# Patient Record
Sex: FEMALE | Race: WHITE | Employment: OTHER | RURAL
[De-identification: names, ages, dates, MRNs, and addresses within clinical notes are randomized per-mention and may not be internally consistent; named-entity substitution may affect disease eponyms.]

---

## 2017-01-04 ENCOUNTER — OFFICE VISIT (OUTPATIENT)
Dept: FAMILY MEDICINE CLINIC | Age: 82
End: 2017-01-04

## 2017-01-04 VITALS
HEART RATE: 62 BPM | WEIGHT: 112.6 LBS | RESPIRATION RATE: 18 BRPM | DIASTOLIC BLOOD PRESSURE: 64 MMHG | SYSTOLIC BLOOD PRESSURE: 128 MMHG | OXYGEN SATURATION: 98 %

## 2017-01-04 DIAGNOSIS — K14.8 TONGUE LESION: Primary | ICD-10-CM

## 2017-01-04 RX ORDER — DEXTROMETHORPHAN POLISTIREX 30 MG/5 ML
SUSPENSION, EXTENDED RELEASE 12 HR ORAL AS NEEDED
COMMUNITY

## 2017-01-09 NOTE — PROGRESS NOTES
1/4/2017 1/8/2017    Chief Complaint   Patient presents with    Mouth Lesions     C/O a bump on the tongue. HPI: Vandana Christianson is a 80 y.o. female. Presents with 3 day history of tongue lesion. Not painful, just aware it's there. No history of trauma, has not bitten her tongue that she is aware of. Allergies   Allergen Reactions    Ambien [Zolpidem] Unknown (comments)    Coconut Unknown (comments)       Current Outpatient Prescriptions   Medication Sig Dispense Refill    eyelid cleanser combination #6 (EYELID WIPES) towl by Apply Externally route daily.  calcium-cholecalciferol, D3, (CALTRATE 600+D) tablet Take 1 Tab by mouth two (2) times a day. 60 Tab 1    gabapentin (NEURONTIN) 300 mg capsule TAKE 2 CAPSULES BY MOUTH EVERY MORNING AND EVERY EVENING 120 Cap 5    gabapentin (NEURONTIN) 100 mg capsule TAKE 1 CAPSULE BY MOUTH ONCE DAILY AT NOON 30 Cap 11    propranolol (INDERAL) 60 mg tablet TAKE 1 TABLET BY MOUTH THREE TIMES DAILY 90 Tab prn    aspirin delayed-release 81 mg tablet TAKE 1 TABLET BY MOUTH ONCE DAILY 100 Tab 3    losartan (COZAAR) 50 mg tablet TAKE 1 TABLET BY MOUTH ONCE DAILY 30 Tab 11    levothyroxine (SYNTHROID) 50 mcg tablet TAKE 1 TABLET BY MOUTH ONCE DAILY 30 Tab 11    Zinc Ox-Aloe Vera-Vitamin E (BALMEX) 11.3 % topical cream Apply  to affected area as needed for Skin Irritation.  cyanocobalamin 1,000 mcg tablet Take 1,000 mcg by mouth daily.  acetaminophen (TYLENOL) 325 mg tablet Take  by mouth every four (4) hours as needed for Pain.  POLYVINYL ALCOHOL/POVIDONE (ARTIFICIAL TEARS OP) Apply  to eye daily as needed.  mineral oil (FLEET) enema Insert  into rectum as needed for Constipation.  LORazepam (ATIVAN) 0.5 mg tablet Take 0.5 mg by mouth two (2) times daily as needed for Anxiety.  loperamide (IMODIUM) 1 mg/7.5 mL solution Take  by mouth three (3) times daily as needed for Diarrhea.       docusate sodium (COLACE) 100 mg capsule Take 100 mg by mouth as needed for Constipation. PRN daily      polyethylene glycol (MIRALAX) 17 gram/dose powder Take 17 g by mouth daily as needed.  magnesium hydroxide (MILK OF MAGNESIA) 400 mg/5 mL suspension Take 30 mL by mouth daily as needed for Constipation.  bisacodyl (DULCOLAX) 10 mg suppository Insert 10 mg into rectum daily as needed.  SENNOSIDES (NATURAL LAXATIVE PO) Take  by mouth. No past medical history on file. Lab Results   Component Value Date/Time    Glucose 99 09/21/2016 02:23 PM    Creatinine 0.84 09/21/2016 02:23 PM       ROS:  Constitutional: No fever, chills or weight loss  Respiratory: No cough, SOB   CV: No chest pain or Palpitations  GI: No nausea, vomiting or diarrhea. : No dysuria or hematuria. Neuro: No headaches, seizures, change in mental status. Physical Exam:   VS Visit Vitals    /64    Pulse 62    Resp 18    Wt 112 lb 9.6 oz (51.1 kg)    SpO2 98%      General Alert, oriented 89 y/o WF. In wheelchair. NAD. Eyes Conjunctiva and lids normal.    PERRLA, EOMI.   ENMT External ears and nose normal.  Canals normal, TMs normal.   Lips, teeth, gums normal, mucous membranes moist.    Oropharynx: no erythema, no exudates, no lesions. Tongue: There is a violaceous raised 5mm lesion on the mid-left tongue. NECK Thyroid: normal size, nontender. Trachea midline, neck symmetrical and without masses. Carotids 2+ with no bruits. Nodes: Left ac shotty nodes Non-tender left submandibular gland. RESP Clear to auscultation and percussion. No rales, wheezes, rhonchi, or rubs. CV RRR, with no S3 or S4, no murmur, no rub. EXT No deformity. Extremities without edema. DP and PT 2+ bilaterally. SKIN Skin warm, normal turgor. NEURO Cranial nerves normal 2-12. No abnormal movement     PSYCH Judgment and insight good. Oriented to person, place, and time. Affect is alert and attentive. Moderate memory loss. 1. Tongue lesion  Consult Dr. Jamal Barakat. Will refer as indicated by CT.   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 Answer:   No    POLYVINYL ALCOHOL/POVIDONE (ARTIFICIAL TEARS OP)     Sig: Apply  to eye daily as needed.  eyelid cleanser combination #6 (EYELID WIPES) towl     Sig: by Apply Externally route daily.  mineral oil (FLEET) enema     Sig: Insert  into rectum as needed for Constipation. Follow-up Disposition:  Return in about 1 week (around 1/11/2017).         EARNEST Bishop

## 2017-04-26 ENCOUNTER — OFFICE VISIT (OUTPATIENT)
Dept: FAMILY MEDICINE CLINIC | Age: 82
End: 2017-04-26

## 2017-04-26 VITALS
OXYGEN SATURATION: 96 % | HEART RATE: 60 BPM | DIASTOLIC BLOOD PRESSURE: 77 MMHG | SYSTOLIC BLOOD PRESSURE: 123 MMHG | RESPIRATION RATE: 18 BRPM

## 2017-04-26 DIAGNOSIS — M77.8 SHOULDER TENDINITIS, RIGHT: Primary | ICD-10-CM

## 2017-04-26 DIAGNOSIS — M75.01 ADHESIVE CAPSULITIS OF RIGHT SHOULDER: ICD-10-CM

## 2017-04-26 RX ORDER — METHYLPREDNISOLONE ACETATE 40 MG/ML
40 INJECTION, SUSPENSION INTRA-ARTICULAR; INTRALESIONAL; INTRAMUSCULAR; SOFT TISSUE ONCE
Qty: 1 VIAL | Refills: 0
Start: 2017-04-26 | End: 2017-04-26

## 2017-05-03 RX ORDER — ZINC GLUCONATE 50 MG
TABLET ORAL
Qty: 30 TAB | Refills: 3 | Status: SHIPPED | OUTPATIENT
Start: 2017-05-03 | End: 2017-09-01 | Stop reason: SDUPTHER

## 2017-06-15 RX ORDER — GABAPENTIN 300 MG/1
CAPSULE ORAL
Qty: 120 CAP | Refills: 2 | Status: SHIPPED | OUTPATIENT
Start: 2017-06-15 | End: 2018-03-16 | Stop reason: SDUPTHER

## 2017-07-12 ENCOUNTER — OFFICE VISIT (OUTPATIENT)
Dept: FAMILY MEDICINE CLINIC | Age: 82
End: 2017-07-12

## 2017-07-12 VITALS
RESPIRATION RATE: 20 BRPM | SYSTOLIC BLOOD PRESSURE: 107 MMHG | TEMPERATURE: 98.1 F | WEIGHT: 121.8 LBS | DIASTOLIC BLOOD PRESSURE: 55 MMHG | HEART RATE: 69 BPM | OXYGEN SATURATION: 96 %

## 2017-07-12 DIAGNOSIS — E53.8 B12 DEFICIENCY: ICD-10-CM

## 2017-07-12 DIAGNOSIS — E03.9 ACQUIRED HYPOTHYROIDISM: ICD-10-CM

## 2017-07-12 DIAGNOSIS — M77.8 SHOULDER TENDINITIS, RIGHT: ICD-10-CM

## 2017-07-12 DIAGNOSIS — I10 ESSENTIAL HYPERTENSION WITH GOAL BLOOD PRESSURE LESS THAN 140/90: Primary | ICD-10-CM

## 2017-07-12 DIAGNOSIS — E55.9 VITAMIN D DEFICIENCY: ICD-10-CM

## 2017-07-12 DIAGNOSIS — Z00.00 MEDICARE ANNUAL WELLNESS VISIT, SUBSEQUENT: ICD-10-CM

## 2017-07-12 RX ORDER — METHYLPREDNISOLONE ACETATE 40 MG/ML
40 INJECTION, SUSPENSION INTRA-ARTICULAR; INTRALESIONAL; INTRAMUSCULAR; SOFT TISSUE ONCE
Qty: 1 VIAL | Refills: 0
Start: 2017-07-12 | End: 2017-07-12

## 2017-07-12 RX ORDER — DICLOFENAC SODIUM 10 MG/G
GEL TOPICAL
COMMUNITY

## 2017-07-12 NOTE — PROGRESS NOTES
7/12/2017    Chief Complaint   Patient presents with    Shoulder Pain     Right shoulder pain, decreased ROM. HPI: Lexie De La Fuente is a 80 y.o. female. [facility] resident. 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 Mod dementia, poor memory. Requires assistance with some ADLs. Presents for recurrent right shoulder pain, decreased ROM. Difficulty using the right arm. Limits her ability to comb her hair and other tasks requiring overhead reaching. She also has a spot on her right forehead she would like to have checked. Has not had labs done > 1 year. HPTN:  ARB, no adverse effects. Orthostatic-postural tremor. Propranolol   Gabapentin. Stable   Non-ambulatory     Allergies   Allergen Reactions    Ambien [Zolpidem] Unknown (comments)    Coconut Unknown (comments)       Current Outpatient Prescriptions   Medication Sig Dispense Refill    methylPREDNISolone acetate (DEPO-MEDROL) 40 mg/mL injection 1 mL by IntraMUSCular route once for 1 dose. 1 Vial 0    gabapentin (NEURONTIN) 300 mg capsule TAKE 2 CAPSULES BY MOUTH EVERY MORNING AND EVERY EVENING 120 Cap 2    VITAMIN B-12 1,000 mcg tablet TAKE 1 TABLET BY MOUTH ONCE DAILY 30 Tab 3    eyelid cleanser combination #6 (EYELID WIPES) towl by Apply Externally route daily.  calcium-cholecalciferol, D3, (CALTRATE 600+D) tablet Take 1 Tab by mouth two (2) times a day. 60 Tab 1    gabapentin (NEURONTIN) 100 mg capsule TAKE 1 CAPSULE BY MOUTH ONCE DAILY AT NOON 30 Cap 11    propranolol (INDERAL) 60 mg tablet TAKE 1 TABLET BY MOUTH THREE TIMES DAILY 90 Tab prn    aspirin delayed-release 81 mg tablet TAKE 1 TABLET BY MOUTH ONCE DAILY 100 Tab 3    losartan (COZAAR) 50 mg tablet TAKE 1 TABLET BY MOUTH ONCE DAILY 30 Tab 11    levothyroxine (SYNTHROID) 50 mcg tablet TAKE 1 TABLET BY MOUTH ONCE DAILY 30 Tab 11    diclofenac (VOLTAREN) 1 % gel Apply  to affected area daily as needed.       MAPAP 325 mg tablet TAKE 2 TABLETS BY MOUTH EVERY 4 HOURS FOR ORAL TEMPERATURE > 100.5 OR COMPLAINT OF PAIN. 60 Tab prn    POLYVINYL ALCOHOL/POVIDONE (ARTIFICIAL TEARS OP) Apply  to eye daily as needed.  mineral oil (FLEET) enema Insert  into rectum as needed for Constipation.  LORazepam (ATIVAN) 0.5 mg tablet Take 0.5 mg by mouth two (2) times daily as needed for Anxiety.  loperamide (IMODIUM) 1 mg/7.5 mL solution Take  by mouth three (3) times daily as needed for Diarrhea.  docusate sodium (COLACE) 100 mg capsule Take 100 mg by mouth as needed for Constipation. PRN daily      polyethylene glycol (MIRALAX) 17 gram/dose powder Take 17 g by mouth daily as needed.  Zinc Ox-Aloe Vera-Vitamin E (BALMEX) 11.3 % topical cream Apply  to affected area as needed for Skin Irritation.  magnesium hydroxide (MILK OF MAGNESIA) 400 mg/5 mL suspension Take 30 mL by mouth daily as needed for Constipation.  bisacodyl (DULCOLAX) 10 mg suppository Insert 10 mg into rectum daily as needed.  SENNOSIDES (NATURAL LAXATIVE PO) Take  by mouth daily as needed. Past Medical History:   Diagnosis Date    Anxiety disorder     Bursitis     Right Shoulder    Chronic kidney disease     Congestive heart failure (HCC)     Essential tremor     Hypertension     Thyroid disease     Hypothyroidism       Lab Results   Component Value Date/Time    Glucose 99 09/21/2016 02:23 PM    Creatinine 0.84 09/21/2016 02:23 PM       ROS:  Constitutional: No fever, chills or weight loss  Respiratory: No cough, SOB   CV: No chest pain or Palpitations  GI: No nausea, vomiting or diarrhea. : No dysuria or hematuria. Neuro: No headaches, seizures, change in mental status. Physical Exam:   VS Visit Vitals    /55    Pulse 69    Temp 98.1 °F (36.7 °C)  Comment: oral    Resp 20    Wt 121 lb 12.8 oz (55.2 kg)    SpO2 96%      General Alert,oriented X 2. NAD. In wheelchair.     Eyes Conjunctiva and lids normal.    PERRLA, EOMI.   ENMT Mucous membranes moist.    Oropharynx: no erythema, no exudates, no lesions, normal tongue. NECK Thyroid: normal size, nontender. Trachea midline, neck symmetrical and without masses. Carotids 2+ with no bruits. No enlarged nodes. RESP Clear to auscultation and percussion. CV RRR, with no S3 or S4, no murmur, no rub. EXT Right shoulder: Decreased ROM, abduction to 75 degrees. Mild-mod tenderness to palpation over superior joint line. Injected with Methylprednisolone 40mg and 2% Lidocaine. See procedure note. SKIN Skin warm, normal turgor. Right lateral forehead: 1cm X 0.6cm slightly raised brown skin lesion consistent with Seborrheic Keratosis. NEURO Cranial nerves normal 2-12. No abnormal movement   PSYCH Judgment and insight good. Fund of knowledge is normal.   Affect is alert and attentive. 1. Essential hypertension with goal blood pressure less than 140/90  Well controlled. - CBC WITH AUTOMATED DIFF  - METABOLIC PANEL, COMPREHENSIVE  - GA COLLECTION VENOUS BLOOD,VENIPUNCTURE    2. Acquired hypothyroidism  Check labs   - CBC WITH AUTOMATED DIFF  - TSH 3RD GENERATION  - GA COLLECTION VENOUS BLOOD,VENIPUNCTURE    3. Shoulder tendinitis, right  Injected with some immediate relief. Will ask PT to assess whether additional PT would be beneficial.   - METHYLPREDNISOLONE ACETATE INJECTION 40 MG  - GA THER/PROPH/DIAG INJECTION, SUBCUT/IM  - methylPREDNISolone acetate (DEPO-MEDROL) 40 mg/mL injection; 1 mL by IntraMUSCular route once for 1 dose. Dispense: 1 Vial; Refill: 0  - DRAIN/INJECT LARGE JOINT/BURSA    4. Vitamin D deficiency  Check labs - VITAMIN D, 25 HYDROXY  - GA COLLECTION VENOUS BLOOD,VENIPUNCTURE    5. B12 deficiency  Check labs  - VITAMIN B12    6.  Seborrheic keratosis  She will return in 2 wks for cryotherapy.            ______________________________________________________________________  Ra Flores is a 80 y.o. female and presents for annual Medicare Wellness Visit. Problem List: Reviewed with patient and discussed risk factors. There is no problem list on file for this patient. Current medical providers:  No care team member to display    PMH, SH, Medications/Allergies: reviewed, on chart. Female Alcohol Screening: On any occasion during the past 3 months, have you had more than 3 drinks containing alcohol? No    Do you average more than 7 drinks per week? No    ROS:  Constitutional: No fever, chills or weight loss  Respiratory: No cough, SOB   CV: No chest pain or Palpitations    Objective:  Visit Vitals    /55    Pulse 69    Temp 98.1 °F (36.7 °C)  Comment: oral    Resp 20    Wt 121 lb 12.8 oz (55.2 kg)    SpO2 96%    There is no height or weight on file to calculate BMI. Assessment of cognitive impairment: Alert and oriented x 2      Depression Screen:   PHQ over the last two weeks 4/26/2017   Little interest or pleasure in doing things Not at all   Feeling down, depressed or hopeless Not at all   Total Score PHQ 2 0       Fall Risk Assessment:    Fall Risk Assessment, last 12 mths 4/26/2017   Able to walk? Yes   Fall in past 12 months? No       Functional Ability:   Does the patient exhibit a steady gait? No, non-ambulatory, postural essentrial tremor   How long did it take the patient to get up and walk from a sitting position? NA   Is the patient self reliant?  (ie can do own laundry, meals, household chores)  no     Does the patient handle his/her own medications?  no     Does the patient handle his/her own money? no     Is the patients home safe (ie good lighting, handrails on stairs and bath, etc.)? yes     Did you notice or did patient express any hearing difficulties? no     Did you notice or did patient express any vision difficulties?    no       Advance Care Planning:   Patient was offered the opportunity to discuss advance care planning:  no     Does patient have an Advance Directive:  yes   If no, did you provide information on Caring Connections?  no       _  Plan:    1. Medicare Wellness  Aged out of screenings. IZs current    2. Advanced directive   On [address] chart. Will get copy into BS chart. Orders Placed This Encounter    DRAIN/INJECT LARGE JOINT/BURSA    CBC WITH AUTOMATED DIFF    METABOLIC PANEL, COMPREHENSIVE    TSH 3RD GENERATION    VITAMIN D, 25 HYDROXY    VITAMIN B12    OR COLLECTION VENOUS BLOOD,VENIPUNCTURE    METHYLPREDNISOLONE ACETATE INJECTION 40 MG    OR THER/PROPH/DIAG INJECTION, SUBCUT/IM    diclofenac (VOLTAREN) 1 % gel    methylPREDNISolone acetate (DEPO-MEDROL) 40 mg/mL injection       Health Maintenance   Topic Date Due    DTaP/Tdap/Td series (1 - Tdap) 09/12/1942    ZOSTER VACCINE AGE 60>  09/12/1981    GLAUCOMA SCREENING Q2Y  09/12/1986    OSTEOPOROSIS SCREENING (DEXA)  09/12/1986    Pneumococcal 65+ Low/Medium Risk (1 of 2 - PCV13) 09/12/1986    MEDICARE YEARLY EXAM  09/12/1986    INFLUENZA AGE 9 TO ADULT  08/01/2017       *Patient verbalized understanding and agreement with the plan. A copy of the After Visit Summary with personalized health plan was given to the patient today. Follow-up Disposition:  Return if symptoms worsen or fail to improve.         EARNEST Bella

## 2017-07-13 LAB
25(OH)D3+25(OH)D2 SERPL-MCNC: 42.7 NG/ML (ref 30–100)
ALBUMIN SERPL-MCNC: 3.9 G/DL (ref 3.2–4.6)
ALBUMIN/GLOB SERPL: 1.4 {RATIO} (ref 1.2–2.2)
ALP SERPL-CCNC: 55 IU/L (ref 39–117)
ALT SERPL-CCNC: 9 IU/L (ref 0–32)
AST SERPL-CCNC: 20 IU/L (ref 0–40)
BASOPHILS # BLD AUTO: 0.1 X10E3/UL (ref 0–0.2)
BASOPHILS NFR BLD AUTO: 1 %
BILIRUB SERPL-MCNC: 0.5 MG/DL (ref 0–1.2)
BUN SERPL-MCNC: 16 MG/DL (ref 10–36)
BUN/CREAT SERPL: 19 (ref 12–28)
CALCIUM SERPL-MCNC: 9.1 MG/DL (ref 8.7–10.3)
CHLORIDE SERPL-SCNC: 91 MMOL/L (ref 96–106)
CO2 SERPL-SCNC: 24 MMOL/L (ref 18–29)
CREAT SERPL-MCNC: 0.86 MG/DL (ref 0.57–1)
EOSINOPHIL # BLD AUTO: 0.4 X10E3/UL (ref 0–0.4)
EOSINOPHIL NFR BLD AUTO: 5 %
ERYTHROCYTE [DISTWIDTH] IN BLOOD BY AUTOMATED COUNT: 13.2 % (ref 12.3–15.4)
GLOBULIN SER CALC-MCNC: 2.7 G/DL (ref 1.5–4.5)
GLUCOSE SERPL-MCNC: 85 MG/DL (ref 65–99)
HCT VFR BLD AUTO: 37.6 % (ref 34–46.6)
HGB BLD-MCNC: 12.5 G/DL (ref 11.1–15.9)
IMM GRANULOCYTES # BLD: 0.1 X10E3/UL (ref 0–0.1)
IMM GRANULOCYTES NFR BLD: 1 %
LYMPHOCYTES # BLD AUTO: 1.7 X10E3/UL (ref 0.7–3.1)
LYMPHOCYTES NFR BLD AUTO: 27 %
MCH RBC QN AUTO: 33.7 PG (ref 26.6–33)
MCHC RBC AUTO-ENTMCNC: 33.2 G/DL (ref 31.5–35.7)
MCV RBC AUTO: 101 FL (ref 79–97)
MONOCYTES # BLD AUTO: 0.8 X10E3/UL (ref 0.1–0.9)
MONOCYTES NFR BLD AUTO: 13 %
NEUTROPHILS # BLD AUTO: 3.4 X10E3/UL (ref 1.4–7)
NEUTROPHILS NFR BLD AUTO: 53 %
PLATELET # BLD AUTO: 220 X10E3/UL (ref 150–379)
POTASSIUM SERPL-SCNC: 4.5 MMOL/L (ref 3.5–5.2)
PROT SERPL-MCNC: 6.6 G/DL (ref 6–8.5)
RBC # BLD AUTO: 3.71 X10E6/UL (ref 3.77–5.28)
SODIUM SERPL-SCNC: 131 MMOL/L (ref 134–144)
TSH SERPL DL<=0.005 MIU/L-ACNC: 1.4 UIU/ML (ref 0.45–4.5)
VIT B12 SERPL-MCNC: 1323 PG/ML (ref 211–946)
WBC # BLD AUTO: 6.4 X10E3/UL (ref 3.4–10.8)

## 2017-07-20 ENCOUNTER — OFFICE VISIT (OUTPATIENT)
Dept: FAMILY MEDICINE CLINIC | Age: 82
End: 2017-07-20

## 2017-07-20 DIAGNOSIS — L82.0 SEBORRHEIC KERATOSES, INFLAMED: Primary | ICD-10-CM

## 2017-07-20 NOTE — PROGRESS NOTES
7/20/2017    CC: Freeze spot on right temple    HPI: Breezy Melton is a 80 y.o. female. [facility] resident. 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 Mod dementia, poor memory. Requires assistance with some ADLs. Spot on her right forehead that gets irritated by her glasses. HPTN:  ARB, no adverse effects. Orthostatic-postural tremor. Propranolol   Gabapentin. Stable   Non-ambulatory     Allergies   Allergen Reactions    Ambien [Zolpidem] Unknown (comments)    Coconut Unknown (comments)       Current Outpatient Prescriptions   Medication Sig Dispense Refill    methylPREDNISolone acetate (DEPO-MEDROL) 40 mg/mL injection 1 mL by IntraMUSCular route once for 1 dose. 1 Vial 0    gabapentin (NEURONTIN) 300 mg capsule TAKE 2 CAPSULES BY MOUTH EVERY MORNING AND EVERY EVENING 120 Cap 2    VITAMIN B-12 1,000 mcg tablet TAKE 1 TABLET BY MOUTH ONCE DAILY 30 Tab 3    eyelid cleanser combination #6 (EYELID WIPES) towl by Apply Externally route daily.  calcium-cholecalciferol, D3, (CALTRATE 600+D) tablet Take 1 Tab by mouth two (2) times a day. 60 Tab 1    gabapentin (NEURONTIN) 100 mg capsule TAKE 1 CAPSULE BY MOUTH ONCE DAILY AT NOON 30 Cap 11    propranolol (INDERAL) 60 mg tablet TAKE 1 TABLET BY MOUTH THREE TIMES DAILY 90 Tab prn    aspirin delayed-release 81 mg tablet TAKE 1 TABLET BY MOUTH ONCE DAILY 100 Tab 3    losartan (COZAAR) 50 mg tablet TAKE 1 TABLET BY MOUTH ONCE DAILY 30 Tab 11    levothyroxine (SYNTHROID) 50 mcg tablet TAKE 1 TABLET BY MOUTH ONCE DAILY 30 Tab 11    diclofenac (VOLTAREN) 1 % gel Apply  to affected area daily as needed.  MAPAP 325 mg tablet TAKE 2 TABLETS BY MOUTH EVERY 4 HOURS FOR ORAL TEMPERATURE > 100.5 OR COMPLAINT OF PAIN. 60 Tab prn    POLYVINYL ALCOHOL/POVIDONE (ARTIFICIAL TEARS OP) Apply  to eye daily as needed.  mineral oil (FLEET) enema Insert  into rectum as needed for Constipation.       LORazepam (ATIVAN) 0.5 mg tablet Take 0.5 mg by mouth two (2) times daily as needed for Anxiety.  loperamide (IMODIUM) 1 mg/7.5 mL solution Take  by mouth three (3) times daily as needed for Diarrhea.  docusate sodium (COLACE) 100 mg capsule Take 100 mg by mouth as needed for Constipation. PRN daily      polyethylene glycol (MIRALAX) 17 gram/dose powder Take 17 g by mouth daily as needed.  Zinc Ox-Aloe Vera-Vitamin E (BALMEX) 11.3 % topical cream Apply  to affected area as needed for Skin Irritation.  magnesium hydroxide (MILK OF MAGNESIA) 400 mg/5 mL suspension Take 30 mL by mouth daily as needed for Constipation.  bisacodyl (DULCOLAX) 10 mg suppository Insert 10 mg into rectum daily as needed.  SENNOSIDES (NATURAL LAXATIVE PO) Take  by mouth daily as needed. Past Medical History:   Diagnosis Date    Anxiety disorder     Bursitis     Right Shoulder    Chronic kidney disease     Congestive heart failure (HCC)     Essential tremor     Hypertension     Thyroid disease     Hypothyroidism       Lab Results   Component Value Date/Time    Glucose 99 09/21/2016 02:23 PM    Creatinine 0.84 09/21/2016 02:23 PM       ROS:  Constitutional: No fever, chills or weight loss  Respiratory: No cough, SOB   CV: No chest pain or Palpitations  GI: No nausea, vomiting or diarrhea. : No dysuria or hematuria. Neuro: No headaches, seizures, change in mental status. Physical Exam:   VS Visit Vitals    /55    Pulse 69    Temp 98.1 °F (36.7 °C)  Comment: oral    Resp 20    Wt 121 lb 12.8 oz (55.2 kg)    SpO2 96%      General Alert,oriented X 2. NAD. In wheelchair. Eyes Conjunctiva and lids normal.    PERRLA, EOMI.   ENMT Mucous membranes moist.    Oropharynx: no erythema, no exudates, no lesions, normal tongue. NECK Thyroid: normal size, nontender. Trachea midline, neck symmetrical and without masses. Carotids 2+ with no bruits. No enlarged nodes. RESP Clear to auscultation and percussion.        CV RRR, with no S3 or S4, no murmur, no rub. SKIN Skin warm, normal turgor. Right lateral forehead: 1cm X 0.6cm slightly raised brown skin lesion consistent with Seborrheic Keratosis. Liquid N2 applied. NEURO Cranial nerves normal 2-12. No abnormal movement   PSYCH Judgment and insight fair. Impaired short term memory. .   Affect is alert and attentive.      1. Seborrheic keratoses, inflamed  Treated topically Liquid N2  - DE DESTRUC BENIGN LESION, UP TO [address], APRN, DNP

## 2017-07-22 ENCOUNTER — TELEPHONE (OUTPATIENT)
Dept: FAMILY MEDICINE CLINIC | Age: 82
End: 2017-07-22

## 2017-07-22 NOTE — TELEPHONE ENCOUNTER
I received a call through the exchange from the patient's nurse this morning. No further information was given  . I attempted to return the call to the nurse ×3    With no answer. Message was left on her voicemail    I attempted to call the patient who answered.   Sounded fine on the phone but was quite hard of hearing and unable to understand any questions    I called her nurse back and left a message for her to get in touch with me  with any questions or problems

## 2017-07-26 ENCOUNTER — OFFICE VISIT (OUTPATIENT)
Dept: FAMILY MEDICINE CLINIC | Age: 82
End: 2017-07-26

## 2017-07-26 VITALS
SYSTOLIC BLOOD PRESSURE: 123 MMHG | TEMPERATURE: 98 F | RESPIRATION RATE: 20 BRPM | OXYGEN SATURATION: 98 % | DIASTOLIC BLOOD PRESSURE: 71 MMHG

## 2017-07-26 DIAGNOSIS — M77.8 SHOULDER TENDINITIS, RIGHT: ICD-10-CM

## 2017-07-26 DIAGNOSIS — L82.0 SEBORRHEIC KERATOSES, INFLAMED: ICD-10-CM

## 2017-07-26 DIAGNOSIS — M75.01 ADHESIVE CAPSULITIS OF RIGHT SHOULDER: ICD-10-CM

## 2017-07-26 DIAGNOSIS — R26.9 NEUROLOGIC AMBULATION DISORDER: Primary | ICD-10-CM

## 2017-07-26 DIAGNOSIS — G25.2 POSTURAL TREMOR: ICD-10-CM

## 2017-07-26 NOTE — PROGRESS NOTES
7/26/2017    Chief Complaint   Patient presents with    Follow-up     Right shoulder pain       HPI: Aminata Loya is a 80 y.o. female. Resident of [place]. She is non-ambulatory due to postural tremor - ambulatory dysfunction. She presents today, had an episode this morning that upset her. She was trying to get to the bathroom but her shower towel was on the floor. She was able to stand up but unable to move and pressed her pendant for help and her aid responded. She continues to have right shoulder pain. Had steroid injection 2 wks ago. Helped for a few days. Seborrheic keratosis treated cryotherapy is healing. Allergies   Allergen Reactions    Ambien [Zolpidem] Unknown (comments)    Coconut Unknown (comments)       Current Outpatient Prescriptions   Medication Sig Dispense Refill    diclofenac (VOLTAREN) 1 % gel Apply  to affected area daily as needed.  gabapentin (NEURONTIN) 300 mg capsule TAKE 2 CAPSULES BY MOUTH EVERY MORNING AND EVERY EVENING 120 Cap 2    MAPAP 325 mg tablet TAKE 2 TABLETS BY MOUTH EVERY 4 HOURS FOR ORAL TEMPERATURE > 100.5 OR COMPLAINT OF PAIN. 60 Tab prn    VITAMIN B-12 1,000 mcg tablet TAKE 1 TABLET BY MOUTH ONCE DAILY 30 Tab 3    POLYVINYL ALCOHOL/POVIDONE (ARTIFICIAL TEARS OP) Apply  to eye daily as needed.  mineral oil (FLEET) enema Insert  into rectum as needed for Constipation.  calcium-cholecalciferol, D3, (CALTRATE 600+D) tablet Take 1 Tab by mouth two (2) times a day.  60 Tab 1    gabapentin (NEURONTIN) 100 mg capsule TAKE 1 CAPSULE BY MOUTH ONCE DAILY AT NOON 30 Cap 11    propranolol (INDERAL) 60 mg tablet TAKE 1 TABLET BY MOUTH THREE TIMES DAILY 90 Tab prn    aspirin delayed-release 81 mg tablet TAKE 1 TABLET BY MOUTH ONCE DAILY 100 Tab 3    losartan (COZAAR) 50 mg tablet TAKE 1 TABLET BY MOUTH ONCE DAILY 30 Tab 11    levothyroxine (SYNTHROID) 50 mcg tablet TAKE 1 TABLET BY MOUTH ONCE DAILY 30 Tab 11    LORazepam (ATIVAN) 0.5 mg tablet Take 0.5 mg by mouth two (2) times daily as needed for Anxiety.  loperamide (IMODIUM) 1 mg/7.5 mL solution Take  by mouth three (3) times daily as needed for Diarrhea.  docusate sodium (COLACE) 100 mg capsule Take 100 mg by mouth as needed for Constipation. PRN daily      polyethylene glycol (MIRALAX) 17 gram/dose powder Take 17 g by mouth daily as needed.  Zinc Ox-Aloe Vera-Vitamin E (BALMEX) 11.3 % topical cream Apply  to affected area as needed for Skin Irritation.  magnesium hydroxide (MILK OF MAGNESIA) 400 mg/5 mL suspension Take 30 mL by mouth daily as needed for Constipation.  bisacodyl (DULCOLAX) 10 mg suppository Insert 10 mg into rectum daily as needed.  SENNOSIDES (NATURAL LAXATIVE PO) Take  by mouth daily as needed.  eyelid cleanser combination #6 (EYELID WIPES) towl by Apply Externally route daily. Past Medical History:   Diagnosis Date    Anxiety disorder     Bursitis     Right Shoulder    Chronic kidney disease     Congestive heart failure (HCC)     Essential tremor     Hypertension     Thyroid disease     Hypothyroidism       Lab Results   Component Value Date/Time    Glucose 85 07/12/2017 12:48 PM    Creatinine 0.86 07/12/2017 12:48 PM       ROS:  Constitutional: No fever, chills or weight loss  Respiratory: No cough, SOB   CV: No chest pain or Palpitations  GI: No nausea, vomiting or diarrhea. : No dysuria or hematuria. Neuro: No headaches, seizures, change in mental status. Physical Exam:   VS Visit Vitals    /71    Temp 98 °F (36.7 °C)    Resp 20    SpO2 98%      General Alert,oriented X 3. NAD. Non-ambulatory in wheelchair. Eyes Conjunctiva and lids normal.    PERRLA, EOMI.   ENMT Mucous membranes moist.    Oropharynx: no erythema, no exudates, no lesions, normal tongue. NECK Thyroid: normal size, nontender. Trachea midline, neck symmetrical and without masses. Carotids 2+ with no bruits. No enlarged nodes.    RESP Clear to auscultation and percussion. No rales, wheezes, rhonchi, or rubs. CV RRR, with no S3 or S4, no murmur, no rub. GI   Normal bowel sounds, no bruit, soft, nontender, without masses. MSKEL Normal gait and station. Normal strength and tone, no atrophy. EXT Right shoulder: No active ROM. Abduction and flexion using left arm to raise. Extremities without edema. SKIN Skin warm, normal turgor. Seborrheic kerasosis on the right temple bullae forming with 1-2 mm erythema surrounding. (normal appearing after crytotherapy)    NEURO Cranial nerves normal 2-12. PSYCH Judgment and insight fair. Fund of knowledge is normal.   Affect is alert and attentive. 1. Shoulder tendinitis, right  Chronic. Suspect rotator cuff tendinitis-disruption     2. Adhesive capsulitis of right shoulder  Chronic. 3. Seborrheic keratoses, inflamed  S/P cryotherapy. Healing normally. 4. Neurologic ambulation disorder  Chronic orthostatic-postural tremor. Reassure her that her \"freezing\" this morning is normal for someone with her tremor and ambulatory dysfunction. 5. Postural tremor  Chronic, stable. Reassure    No orders of the defined types were placed in this encounter. Follow-up Disposition:  Return if symptoms worsen or fail to improve.         EARNEST Rain

## 2017-09-01 RX ORDER — LANOLIN ALCOHOL/MO/W.PET/CERES
CREAM (GRAM) TOPICAL
Qty: 30 TAB | Refills: 3 | Status: SHIPPED | OUTPATIENT
Start: 2017-09-01 | End: 2018-09-14 | Stop reason: SDUPTHER

## 2018-03-20 RX ORDER — GABAPENTIN 300 MG/1
CAPSULE ORAL
Qty: 120 CAP | Status: SHIPPED | OUTPATIENT
Start: 2018-03-20

## 2019-01-22 ENCOUNTER — CLINICAL SUPPORT (OUTPATIENT)
Dept: CARDIOLOGY CLINIC | Age: 84
End: 2019-01-22

## 2019-01-22 ENCOUNTER — OFFICE VISIT (OUTPATIENT)
Dept: CARDIOLOGY CLINIC | Age: 84
End: 2019-01-22

## 2019-01-22 VITALS
HEART RATE: 64 BPM | DIASTOLIC BLOOD PRESSURE: 52 MMHG | OXYGEN SATURATION: 91 % | RESPIRATION RATE: 16 BRPM | WEIGHT: 121 LBS | HEIGHT: 58 IN | BODY MASS INDEX: 25.4 KG/M2 | SYSTOLIC BLOOD PRESSURE: 102 MMHG

## 2019-01-22 DIAGNOSIS — I49.5 SINUS NODE DYSFUNCTION (HCC): ICD-10-CM

## 2019-01-22 DIAGNOSIS — I10 HYPERTENSION, UNSPECIFIED TYPE: Primary | ICD-10-CM

## 2019-01-22 DIAGNOSIS — Z95.0 CARDIAC PACEMAKER IN SITU: Primary | ICD-10-CM

## 2019-01-22 RX ORDER — HYDROCODONE BITARTRATE AND ACETAMINOPHEN 5; 325 MG/1; MG/1
1 TABLET ORAL
COMMUNITY
Start: 2018-10-18

## 2019-01-22 RX ORDER — DULOXETIN HYDROCHLORIDE 30 MG/1
30 CAPSULE, DELAYED RELEASE ORAL DAILY
COMMUNITY
Start: 2018-12-14

## 2019-01-22 RX ORDER — MIRTAZAPINE 15 MG/1
7.5 TABLET, FILM COATED ORAL
COMMUNITY
Start: 2018-12-27

## 2019-01-22 NOTE — PROGRESS NOTES
Subjective:  
  
Bessie Junior is a 80 y.o. female is here for EP consult. The patient denies chest pain/ shortness of breath, orthopnea, PND, LE edema, palpitations, syncope, presyncope or fatigue. She reports having PPM implanted in 2014 for syncope in [place]. Has been in [place] for 3 years with no check. There are no active problems to display for this patient. Marcie Quach NP Past Medical History:  
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 No pertinent surgical history. Allergies Allergen Reactions  Ambien [Zolpidem] Unknown (comments)  Coconut Unknown (comments) History reviewed. No pertinent family history. negative for cardiac disease Social History Socioeconomic History  Marital status: UNKNOWN Spouse name: Not on file  Number of children: Not on file  Years of education: Not on file  Highest education level: Not on file Tobacco Use  Smoking status: Former Smoker  Smokeless tobacco: Never Used Current Outpatient Medications Medication Sig  
 HYDROcodone-acetaminophen (NORCO) 5-325 mg per tablet Take 1 Tab by mouth every four (4) hours as needed.  DULoxetine (CYMBALTA) 30 mg capsule  mirtazapine (REMERON) 15 mg tablet 7.5 mg.  
 levothyroxine (SYNTHROID) 50 mcg tablet TAKE 1 TABLET BY MOUTH EVERY DAY  VITAMIN B-12 1,000 mcg tablet TAKE 1 TABLET BY MOUTH ONCE DAILY  gabapentin (NEURONTIN) 300 mg capsule TAKE 2 CAPSULES BY MOUTH EVERY MORNING AND EVERY EVENING  
 aspirin delayed-release 81 mg tablet TAKE 1 TABLET BY MOUTH ONCE DAILY  losartan (COZAAR) 50 mg tablet TAKE 1 TABLET BY MOUTH ONCE DAILY  gabapentin (NEURONTIN) 100 mg capsule TAKE 1 CAPSULE BY MOUTH ONCE DAILY AT [facility] BR[facility]EN ARROW  propranolol (INDERAL) 60 mg tablet TAKE 1 TABLET BY MOUTH THREE TIMES DAILY  diclofenac (VOLTAREN) 1 % gel Apply  to affected area daily as needed.  MAPAP 325 mg tablet TAKE 2 TABLETS BY MOUTH EVERY 4 HOURS FOR ORAL TEMPERATURE > 100.5 OR COMPLAINT OF PAIN.  POLYVINYL ALCOHOL/POVIDONE (ARTIFICIAL TEARS OP) Apply  to eye daily as needed.  eyelid cleanser combination #6 (EYELID WIPES) towl by Apply Externally route daily.  mineral oil (FLEET) enema Insert  into rectum as needed for Constipation.  calcium-cholecalciferol, D3, (CALTRATE 600+D) tablet Take 1 Tab by mouth two (2) times a day.  LORazepam (ATIVAN) 0.5 mg tablet Take 0.5 mg by mouth two (2) times daily as needed for Anxiety.  loperamide (IMODIUM) 1 mg/7.5 mL solution Take  by mouth three (3) times daily as needed for Diarrhea.  docusate sodium (COLACE) 100 mg capsule Take 100 mg by mouth as needed for Constipation. PRN daily  polyethylene glycol (MIRALAX) 17 gram/dose powder Take 17 g by mouth daily as needed.  Zinc Ox-Aloe Vera-Vitamin E (BALMEX) 11.3 % topical cream Apply  to affected area as needed for Skin Irritation.  magnesium hydroxide (MILK OF MAGNESIA) 400 mg/5 mL suspension Take 30 mL by mouth daily as needed for Constipation.  bisacodyl (DULCOLAX) 10 mg suppository Insert 10 mg into rectum daily as needed.  SENNOSIDES (NATURAL LAXATIVE PO) Take  by mouth daily as needed. No current facility-administered medications for this visit. Vitals:  
 01/22/19 7027 BP: 102/52 Pulse: 64 Resp: 16 SpO2: 91% Weight: 121 lb (54.9 kg) Height: 4' 10\" (1.473 m) I have reviewed the nurses notes, vitals, problem list, allergy list, medical history, family, social history and medications. Review of Symptoms: 
 
General: Pt denies excessive weight gain or loss.  Pt is able to conduct ADL's 
 HEENT: Denies blurred vision, headaches, epistaxis and difficulty swallowing. Respiratory: Denies shortness of breath, SKAGGS, wheezing or stridor. Cardiovascular: Denies precordial pain, palpitations, edema or PND Gastrointestinal: Denies poor appetite, indigestion, abdominal pain or blood in stool Urinary: Denies dysuria, pyuria Musculoskeletal: Denies pain or swelling from muscles or joints Neurologic: Denies tremor, paresthesias, or sensory motor disturbance Skin: Denies rash, itching or texture change. Psych: Denies depression Physical Exam:   
 
General: Well developed, in no acute distress. HEENT: Eyes - PERRL, no jvd Heart:  Normal S1/S2 negative S3 or S4. Regular, no murmur, gallop or rub.  
Respiratory: Clear bilaterally x 4, no wheezing or rales Extremities:  No edema, normal cap refill, no cyanosis. Musculoskeletal: No clubbing Neuro: A&Ox3, speech clear, gait stable. Skin: Skin color is normal. No rashes or lesions. Non diaphoretic Vascular: 2+ pulses symmetric in all extremities Cardiographics Ekg: Sinus   Rhythm Low voltage in precordial leads.  
 -Anterior infarct -age undetermined. No results found for this or any previous visit. Lab Results Component Value Date/Time WBC 7.5 11/29/2018 11:50 AM  
 HGB 11.7 11/29/2018 11:50 AM  
 HCT 34.4 (L) 11/29/2018 11:50 AM  
 PLATELET 023 27/01/0856 11:50 AM  
 .4 (H) 11/29/2018 11:50 AM  
  
Lab Results Component Value Date/Time  Sodium 137 11/29/2018 11:50 AM  
 Potassium 4.4 11/29/2018 11:50 AM  
 Chloride 99 11/29/2018 11:50 AM  
 CO2 29 11/29/2018 11:50 AM  
 Anion gap 9 11/29/2018 11:50 AM  
 Glucose 90 11/29/2018 11:50 AM  
 BUN 16 11/29/2018 11:50 AM  
 Creatinine 0.92 11/29/2018 11:50 AM  
 BUN/Creatinine ratio 17 11/29/2018 11:50 AM  
 GFR est AA >60 11/29/2018 11:50 AM  
 GFR est non-AA 57 (L) 11/29/2018 11:50 AM  
 Calcium 9.0 11/29/2018 11:50 AM  
 Bilirubin, total 0.6 11/29/2018 11:50 AM  
 AST (SGOT) 23 2018 11:50 AM  
 Alk. phosphatase 50 2018 11:50 AM  
 Protein, total 6.3 (L) 2018 11:50 AM  
 Albumin 3.1 (L) 2018 11:50 AM  
 Globulin 3.2 2018 11:50 AM  
 A-G Ratio 1.0 (L) 2018 11:50 AM  
 ALT (SGPT) 17 2018 11:50 AM  
  
Lab Results Component Value Date/Time TSH 3.02 2018 11:50 AM  
 
 
 Assessment: ICD-10-CM ICD-9-CM 1. Hypertension, unspecified type I10 401.9 AMB POC EKG ROUTINE W/ 12 LEADS, INTER & REP Orders Placed This Encounter  AMB POC EKG ROUTINE W/ 12 LEADS, INTER & REP Order Specific Question:   Reason for Exam: Answer:   routine  HYDROcodone-acetaminophen (NORCO) 5-325 mg per tablet Sig: Take 1 Tab by mouth every four (4) hours as needed.  DULoxetine (CYMBALTA) 30 mg capsule  mirtazapine (REMERON) 15 mg tablet Si.5 mg.  
 
 
 Plan:  
 
Ms Moore is a pleasant 80year old female with hx of syncope with PPM, last interrogated > 3 years. Device interrogation today with 83% AP and 0.1% RVP, battery life is 7.5 years. Will enroll in remote monitoring and follow up in 1 year. Continue medical management for HTN, SSS and hypothyroid. Thank you for allowing me to participate in Suzi Blanco 's care. Fernando Jarquin NP Patient seen and examined. All pertinent data reviewed. I have reviewed detailed note as outlined by Fernando Jarquin NP. Case discussed with Nursing/medical assistant staff and Fernando Jarquin NP. Plans as outlined. A paced 83% for sick sinus. No sycnope. Cont med rx for htn and hypothroidism. Enroll in our device clinic. F/u in one year.  
 
Charly Thomas MD, Brandie Scanlon

## 2019-01-22 NOTE — PROGRESS NOTES
Chief Complaint Patient presents with  New Patient  
  referred by PCP, hasn't had pacemaker check in 3 yrs. 1. Have you been to the ER, urgent care clinic since your last visit? Hospitalized since your last visit? No 
 
2. Have you seen or consulted any other health care providers outside of the [address] [place] [address] since your last visit? Include any pap smears or colon screening.  No

## 2019-04-24 ENCOUNTER — CLINICAL SUPPORT (OUTPATIENT)
Dept: CARDIOLOGY CLINIC | Age: 84
End: 2019-04-24

## 2019-04-24 DIAGNOSIS — Z95.0 CARDIAC PACEMAKER IN SITU: Primary | ICD-10-CM

## 2019-04-24 DIAGNOSIS — I49.5 SINUS NODE DYSFUNCTION (HCC): ICD-10-CM

## 2019-07-24 ENCOUNTER — CLINICAL SUPPORT (OUTPATIENT)
Dept: CARDIOLOGY CLINIC | Age: 84
End: 2019-07-24

## 2019-07-24 DIAGNOSIS — Z95.0 CARDIAC PACEMAKER IN SITU: Primary | ICD-10-CM

## 2019-07-24 DIAGNOSIS — I49.5 SINUS NODE DYSFUNCTION (HCC): ICD-10-CM

## 2019-10-28 ENCOUNTER — CLINICAL SUPPORT (OUTPATIENT)
Dept: CARDIOLOGY CLINIC | Age: 84
End: 2019-10-28

## 2019-10-28 DIAGNOSIS — Z95.0 CARDIAC PACEMAKER IN SITU: Primary | ICD-10-CM

## 2019-10-28 DIAGNOSIS — I49.5 SINUS NODE DYSFUNCTION (HCC): ICD-10-CM

## 2020-03-04 ENCOUNTER — CLINICAL SUPPORT (OUTPATIENT)
Dept: CARDIOLOGY CLINIC | Age: 85
End: 2020-03-04

## 2020-03-04 ENCOUNTER — OFFICE VISIT (OUTPATIENT)
Dept: CARDIOLOGY CLINIC | Age: 85
End: 2020-03-04

## 2020-03-04 VITALS
RESPIRATION RATE: 16 BRPM | SYSTOLIC BLOOD PRESSURE: 100 MMHG | OXYGEN SATURATION: 95 % | DIASTOLIC BLOOD PRESSURE: 60 MMHG | HEART RATE: 60 BPM | HEIGHT: 58 IN | BODY MASS INDEX: 26.96 KG/M2

## 2020-03-04 DIAGNOSIS — I10 HYPERTENSION, UNSPECIFIED TYPE: ICD-10-CM

## 2020-03-04 DIAGNOSIS — I49.5 SINUS NODE DYSFUNCTION (HCC): ICD-10-CM

## 2020-03-04 DIAGNOSIS — Z95.0 CARDIAC PACEMAKER IN SITU: ICD-10-CM

## 2020-03-04 DIAGNOSIS — Z95.0 CARDIAC PACEMAKER IN SITU: Primary | ICD-10-CM

## 2020-03-04 DIAGNOSIS — I49.5 SINUS NODE DYSFUNCTION (HCC): Primary | ICD-10-CM

## 2020-03-04 NOTE — PROGRESS NOTES
Subjective:      Chauncey Arnett is a 80 y.o. female is here for EP follow up. The patient denies chest pain/ shortness of breath, orthopnea, PND, LE edema, palpitations, syncope, presyncope or fatigue. There are no active problems to display for this patient. Ethel Smith NP  Past Medical History:   Diagnosis Date    Anxiety disorder     Bursitis     Right Shoulder    Chronic kidney disease     Congestive heart failure ([address] [address]     Essential tremor     Hypertension     Pacemaker 2014    Postural tremor     Legs, unable to ambulate    Thyroid disease     Hypothyroidism      No past surgical history on file. Allergies   Allergen Reactions    Ambien [Zolpidem] Unknown (comments)    Coconut Unknown (comments)      No family history on file. negative for cardiac disease  Social History     Socioeconomic History    Marital status:      Spouse name: Not on file    Number of children: Not on file    Years of education: Not on file    Highest education level: Not on file   Tobacco Use    Smoking status: Former Smoker    Smokeless tobacco: Never Used    Tobacco comment: quit about 50 years ago   Substance and Sexual Activity    Alcohol use: Yes     Alcohol/week: 7.0 standard drinks     Types: 7 Glasses of wine per week     Comment: 1 glass of wine daily    Drug use: Never     Current Outpatient Medications   Medication Sig    HYDROcodone-acetaminophen (NORCO) 5-325 mg per tablet Take 1 Tab by mouth every four (4) hours as needed.  DULoxetine (CYMBALTA) 30 mg capsule Take 30 mg by mouth daily.  mirtazapine (REMERON) 15 mg tablet Take 7.5 mg by mouth nightly.  levothyroxine (SYNTHROID) 50 mcg tablet TAKE 1 TABLET BY MOUTH EVERY DAY (Patient taking differently: Take 75 mcg by mouth Daily (before breakfast). )    VITAMIN B-12 1,000 mcg tablet TAKE 1 TABLET BY MOUTH ONCE DAILY    gabapentin (NEURONTIN) 300 mg capsule TAKE 2 CAPSULES BY MOUTH EVERY MORNING AND EVERY EVENING    aspirin delayed-release 81 mg tablet TAKE 1 TABLET BY MOUTH ONCE DAILY    losartan (COZAAR) 50 mg tablet TAKE 1 TABLET BY MOUTH ONCE DAILY (Patient taking differently: Take 25 mg by mouth daily.)    gabapentin (NEURONTIN) 100 mg capsule TAKE 1 CAPSULE BY MOUTH ONCE DAILY AT NOON    propranolol (INDERAL) 60 mg tablet TAKE 1 TABLET BY MOUTH THREE TIMES DAILY    MAPAP 325 mg tablet TAKE 2 TABLETS BY MOUTH EVERY 4 HOURS FOR ORAL TEMPERATURE > 100.5 OR COMPLAINT OF PAIN.  POLYVINYL ALCOHOL/POVIDONE (ARTIFICIAL TEARS OP) Apply  to eye daily as needed.  eyelid cleanser combination #6 (EYELID WIPES) towl by Apply Externally route daily.  mineral oil (FLEET) enema Insert  into rectum as needed for Constipation.  calcium-cholecalciferol, D3, (CALTRATE 600+D) tablet Take 1 Tab by mouth two (2) times a day.  LORazepam (ATIVAN) 0.5 mg tablet Take 0.5 mg by mouth two (2) times daily as needed for Anxiety.  loperamide (IMODIUM) 1 mg/7.5 mL solution Take  by mouth three (3) times daily as needed for Diarrhea.  docusate sodium (COLACE) 100 mg capsule Take 100 mg by mouth as needed for Constipation. PRN daily    polyethylene glycol (MIRALAX) 17 gram/dose powder Take 17 g by mouth daily as needed.  Zinc Ox-Aloe Vera-Vitamin E (BALMEX) 11.3 % topical cream Apply  to affected area as needed for Skin Irritation.  magnesium hydroxide (MILK OF MAGNESIA) 400 mg/5 mL suspension Take 30 mL by mouth daily as needed for Constipation.  bisacodyl (DULCOLAX) 10 mg suppository Insert 10 mg into rectum daily as needed.  SENNOSIDES (NATURAL LAXATIVE PO) Take  by mouth daily as needed.  diclofenac (VOLTAREN) 1 % gel Apply  to affected area daily as needed. No current facility-administered medications for this visit.        Vitals:    03/04/20 1350   BP: 100/60   Pulse: 60   Resp: 16   SpO2: 95%   Height: 4' 10\" (1.473 m)       I have reviewed the nurses notes, vitals, problem list, allergy list, medical history, family, social history and medications. Review of Symptoms:    General: Pt denies excessive weight gain or loss. Pt is able to conduct ADL's  HEENT: Denies blurred vision, headaches, epistaxis and difficulty swallowing. Respiratory: Denies shortness of breath, SKAGGS, wheezing or stridor. Cardiovascular: Denies precordial pain, palpitations, edema or PND  Gastrointestinal: Denies poor appetite, indigestion, abdominal pain or blood in stool  Urinary: Denies dysuria, pyuria  Musculoskeletal: Denies pain or swelling from muscles or joints  Neurologic: Denies tremor, paresthesias, or sensory motor disturbance  Skin: Denies rash, itching or texture change. Psych: Denies depression      Physical Exam:      General: Well developed, in no acute distress. HEENT: Eyes - PERRL, no jvd  Heart:  Normal S1/S2 negative S3 or S4. Regular, no murmur, gallop or rub. Respiratory: Clear bilaterally x 4, no wheezing or rales  Extremities:  No edema, normal cap refill, no cyanosis. Musculoskeletal: No clubbing  Neuro: A&Ox3, speech clear, gait stable. Skin: Skin color is normal. No rashes or lesions. Non diaphoretic  Vascular: 2+ pulses symmetric in all extremities    Cardiographics    Ekg: Sinus Rhythm   P:QRS - 1:1, Abnormal P axis, H Rate 60  Low voltage in precordial leads. No results found for this or any previous visit.       Lab Results   Component Value Date/Time    WBC 8.8 11/29/2019 01:50 PM    HGB 12.3 11/29/2019 01:50 PM    HCT 37.3 11/29/2019 01:50 PM    PLATELET 021 71/63/8678 01:50 PM    .8 (H) 11/29/2019 01:50 PM      Lab Results   Component Value Date/Time    Sodium 135 (L) 11/29/2019 01:50 PM    Potassium 4.7 11/29/2019 01:50 PM    Chloride 98 11/29/2019 01:50 PM    CO2 30 11/29/2019 01:50 PM    Anion gap 7 11/29/2019 01:50 PM    Glucose 105 (H) 11/29/2019 01:50 PM    BUN 18 11/29/2019 01:50 PM    Creatinine 1.16 (H) 11/29/2019 01:50 PM    BUN/Creatinine ratio 16 11/29/2019 01:50 PM    GFR est AA 52 (L) 11/29/2019 01:50 PM    GFR est non-AA 43 (L) 11/29/2019 01:50 PM    Calcium 9.0 11/29/2019 01:50 PM    Bilirubin, total 0.4 11/29/2019 01:50 PM    AST (SGOT) 22 11/29/2019 01:50 PM    Alk. phosphatase 89 11/29/2019 01:50 PM    Protein, total 7.3 11/29/2019 01:50 PM    Albumin 3.5 11/29/2019 01:50 PM    Globulin 3.8 11/29/2019 01:50 PM    A-G Ratio 0.9 (L) 11/29/2019 01:50 PM    ALT (SGPT) 24 11/29/2019 01:50 PM      Lab Results   Component Value Date/Time    TSH 0.45 01/29/2020 01:55 PM        Assessment:           ICD-10-CM ICD-9-CM    1. Sinus node dysfunction (HCC) I49.5 427.81 AMB POC EKG ROUTINE W/ 12 LEADS, INTER & REP   2. Hypertension, unspecified type I10 401.9 AMB POC EKG ROUTINE W/ 12 LEADS, INTER & REP   3. Cardiac pacemaker in situ Z95.0 V45.01 AMB POC EKG ROUTINE W/ 12 LEADS, INTER & REP     Orders Placed This Encounter    AMB POC EKG ROUTINE W/ 12 LEADS, INTER & REP     Order Specific Question:   Reason for Exam:     Answer:   routine        Plan:      Ms Moore is a pleasant 80year old female with hx of syncope with PPM here for annual follow up. Device interrogation today with 89% AP and 0.1% RVP, battery life is 5 years. She is in sinus and normotensive. Asymptomatic. Enrolled in remote monitoring and follow up in 1 year.      Continue medical management for HTN, SSS and hypothyroid.       Thank you for allowing me to participate in Tomas Doss 's care.       Kim Pryor NP

## 2020-03-04 NOTE — PROGRESS NOTES
1. Have you been to the ER, urgent care clinic since your last visit? Hospitalized since your last visit? No.    2. Have you seen or consulted any other health care providers outside of the [address] [place] [address] since your last visit? Include any pap smears or colon screening. Staying at [facility].       Chief Complaint   Patient presents with    Annual Exam     device check- pt denies any cardiac symptoms

## 2020-06-08 ENCOUNTER — OFFICE VISIT (OUTPATIENT)
Dept: CARDIOLOGY CLINIC | Age: 85
End: 2020-06-08

## 2020-06-08 DIAGNOSIS — Z95.0 CARDIAC PACEMAKER IN SITU: Primary | ICD-10-CM

## 2020-06-08 DIAGNOSIS — I49.5 SINUS NODE DYSFUNCTION (HCC): ICD-10-CM

## 2020-09-14 ENCOUNTER — OFFICE VISIT (OUTPATIENT)
Dept: CARDIOLOGY CLINIC | Age: 85
End: 2020-09-14
Payer: MEDICARE

## 2020-09-14 DIAGNOSIS — I49.5 SINUS NODE DYSFUNCTION (HCC): ICD-10-CM

## 2020-09-14 DIAGNOSIS — Z95.0 CARDIAC PACEMAKER IN SITU: Primary | ICD-10-CM

## 2020-09-14 PROCEDURE — 93294 REM INTERROG EVL PM/LDLS PM: CPT | Performed by: INTERNAL MEDICINE

## 2020-12-14 ENCOUNTER — OFFICE VISIT (OUTPATIENT)
Dept: CARDIOLOGY CLINIC | Age: 85
End: 2020-12-14
Payer: MEDICARE

## 2020-12-14 DIAGNOSIS — Z95.0 CARDIAC PACEMAKER IN SITU: Primary | ICD-10-CM

## 2020-12-14 DIAGNOSIS — I49.5 SINUS NODE DYSFUNCTION (HCC): ICD-10-CM

## 2020-12-14 PROCEDURE — 93294 REM INTERROG EVL PM/LDLS PM: CPT | Performed by: INTERNAL MEDICINE

## 2021-03-29 ENCOUNTER — OFFICE VISIT (OUTPATIENT)
Dept: CARDIOLOGY CLINIC | Age: 86
End: 2021-03-29
Payer: MEDICARE

## 2021-03-29 DIAGNOSIS — I49.5 SINUS NODE DYSFUNCTION (HCC): ICD-10-CM

## 2021-03-29 DIAGNOSIS — Z95.0 CARDIAC PACEMAKER IN SITU: Primary | ICD-10-CM

## 2021-03-29 PROCEDURE — 93294 REM INTERROG EVL PM/LDLS PM: CPT | Performed by: INTERNAL MEDICINE

## 2021-05-11 ENCOUNTER — HOSPITAL ENCOUNTER (OUTPATIENT)
Dept: LAB | Age: 86
Discharge: HOME | End: 2021-05-11
Payer: MEDICARE

## 2021-05-11 ENCOUNTER — TRANSCRIBE ORDER (OUTPATIENT)
Dept: REGISTRATION | Age: 86
End: 2021-05-11

## 2021-05-11 PROCEDURE — 87205 SMEAR GRAM STAIN: CPT

## 2021-05-11 PROCEDURE — 87077 CULTURE AEROBIC IDENTIFY: CPT

## 2021-05-11 PROCEDURE — 87186 SC STD MICRODIL/AGAR DIL: CPT

## 2021-05-15 LAB
BACTERIA SPEC CULT: ABNORMAL
GRAM STN SPEC: ABNORMAL
GRAM STN SPEC: ABNORMAL
SERVICE CMNT-IMP: ABNORMAL

## 2021-07-06 ENCOUNTER — OFFICE VISIT (OUTPATIENT)
Dept: CARDIOLOGY CLINIC | Age: 86
End: 2021-07-06
Payer: MEDICARE

## 2021-07-06 DIAGNOSIS — Z95.0 CARDIAC PACEMAKER IN SITU: Primary | ICD-10-CM

## 2021-07-06 DIAGNOSIS — I49.5 SINUS NODE DYSFUNCTION (HCC): ICD-10-CM

## 2021-07-06 PROCEDURE — 93296 REM INTERROG EVL PM/IDS: CPT | Performed by: INTERNAL MEDICINE

## 2021-07-06 PROCEDURE — 93294 REM INTERROG EVL PM/LDLS PM: CPT | Performed by: INTERNAL MEDICINE

## 2021-07-07 ENCOUNTER — APPOINTMENT (OUTPATIENT)
Dept: GENERAL RADIOLOGY | Age: 86
End: 2021-07-07
Attending: EMERGENCY MEDICINE
Payer: MEDICARE

## 2021-07-07 ENCOUNTER — HOSPITAL ENCOUNTER (EMERGENCY)
Age: 86
Discharge: HOME | End: 2021-07-07
Attending: EMERGENCY MEDICINE
Payer: MEDICARE

## 2021-07-07 VITALS
HEART RATE: 90 BPM | HEIGHT: 60 IN | DIASTOLIC BLOOD PRESSURE: 69 MMHG | TEMPERATURE: 98.6 F | OXYGEN SATURATION: 99 % | BODY MASS INDEX: 24.35 KG/M2 | RESPIRATION RATE: 20 BRPM | SYSTOLIC BLOOD PRESSURE: 147 MMHG | WEIGHT: 124 LBS

## 2021-07-07 DIAGNOSIS — R11.11 NON-INTRACTABLE VOMITING WITHOUT NAUSEA, UNSPECIFIED VOMITING TYPE: ICD-10-CM

## 2021-07-07 DIAGNOSIS — R13.10 DYSPHAGIA, UNSPECIFIED TYPE: Primary | ICD-10-CM

## 2021-07-07 LAB
ALBUMIN SERPL-MCNC: 3.2 G/DL (ref 3.5–5)
ALBUMIN/GLOB SERPL: 0.7 {RATIO} (ref 1.1–2.2)
ALP SERPL-CCNC: 113 U/L (ref 45–117)
ALT SERPL-CCNC: 23 U/L (ref 12–78)
ANION GAP SERPL CALC-SCNC: 8 MMOL/L (ref 5–15)
APPEARANCE UR: CLEAR
AST SERPL-CCNC: 27 U/L (ref 15–37)
BASOPHILS # BLD: 0 K/UL (ref 0–0.1)
BASOPHILS NFR BLD: 0 % (ref 0–1)
BILIRUB SERPL-MCNC: 0.3 MG/DL (ref 0.2–1)
BILIRUB UR QL: NEGATIVE
BUN SERPL-MCNC: 15 MG/DL (ref 6–20)
BUN/CREAT SERPL: 13 (ref 12–20)
CALCIUM SERPL-MCNC: 9.3 MG/DL (ref 8.5–10.1)
CHLORIDE SERPL-SCNC: 96 MMOL/L (ref 97–108)
CO2 SERPL-SCNC: 25 MMOL/L (ref 21–32)
COLOR UR: NORMAL
CREAT SERPL-MCNC: 1.12 MG/DL (ref 0.55–1.02)
DIFFERENTIAL METHOD BLD: ABNORMAL
EOSINOPHIL # BLD: 2.7 K/UL (ref 0–0.4)
EOSINOPHIL NFR BLD: 26 % (ref 0–7)
ERYTHROCYTE [DISTWIDTH] IN BLOOD BY AUTOMATED COUNT: 13.2 % (ref 11.5–14.5)
GLOBULIN SER CALC-MCNC: 4.3 G/DL (ref 2–4)
GLUCOSE SERPL-MCNC: 112 MG/DL (ref 65–100)
GLUCOSE UR STRIP.AUTO-MCNC: NEGATIVE MG/DL
HCT VFR BLD AUTO: 37.5 % (ref 35–47)
HGB BLD-MCNC: 12.6 G/DL (ref 11.5–16)
HGB UR QL STRIP: NEGATIVE
IMM GRANULOCYTES # BLD AUTO: 0 K/UL (ref 0–0.04)
IMM GRANULOCYTES NFR BLD AUTO: 0 % (ref 0–0.5)
KETONES UR QL STRIP.AUTO: NEGATIVE MG/DL
LEUKOCYTE ESTERASE UR QL STRIP.AUTO: NEGATIVE
LIPASE SERPL-CCNC: 110 U/L (ref 73–393)
LYMPHOCYTES # BLD: 1 K/UL (ref 0.8–3.5)
LYMPHOCYTES NFR BLD: 10 % (ref 12–49)
MCH RBC QN AUTO: 34 PG (ref 26–34)
MCHC RBC AUTO-ENTMCNC: 33.6 G/DL (ref 30–36.5)
MCV RBC AUTO: 101.1 FL (ref 80–99)
MONOCYTES # BLD: 0.3 K/UL (ref 0–1)
MONOCYTES NFR BLD: 3 % (ref 5–13)
NEUTS BAND NFR BLD MANUAL: 6 %
NEUTS SEG # BLD: 6.4 K/UL (ref 1.8–8)
NEUTS SEG NFR BLD: 55 % (ref 32–75)
NITRITE UR QL STRIP.AUTO: NEGATIVE
NRBC # BLD: 0 K/UL (ref 0–0.01)
NRBC BLD-RTO: 0 PER 100 WBC
PH UR STRIP: 6.5 [PH] (ref 5–8)
PLATELET # BLD AUTO: 261 K/UL (ref 150–400)
PLATELET COMMENTS,PCOM: ABNORMAL
PMV BLD AUTO: 10.3 FL (ref 8.9–12.9)
POTASSIUM SERPL-SCNC: 4.5 MMOL/L (ref 3.5–5.1)
PROT SERPL-MCNC: 7.5 G/DL (ref 6.4–8.2)
PROT UR STRIP-MCNC: NEGATIVE MG/DL
RBC # BLD AUTO: 3.71 M/UL (ref 3.8–5.2)
RBC MORPH BLD: ABNORMAL
SODIUM SERPL-SCNC: 129 MMOL/L (ref 136–145)
SP GR UR REFRACTOMETRY: 1.01 (ref 1–1.03)
TROPONIN I SERPL-MCNC: <0.05 NG/ML
UROBILINOGEN UR QL STRIP.AUTO: 0.2 EU/DL (ref 0.2–1)
WBC # BLD AUTO: 10.4 K/UL (ref 3.6–11)

## 2021-07-07 PROCEDURE — 83690 ASSAY OF LIPASE: CPT

## 2021-07-07 PROCEDURE — 81003 URINALYSIS AUTO W/O SCOPE: CPT

## 2021-07-07 PROCEDURE — 80053 COMPREHEN METABOLIC PANEL: CPT

## 2021-07-07 PROCEDURE — 51701 INSERT BLADDER CATHETER: CPT

## 2021-07-07 PROCEDURE — 77030019903 HC CATH URET INT BARD -A

## 2021-07-07 PROCEDURE — 84484 ASSAY OF TROPONIN QUANT: CPT

## 2021-07-07 PROCEDURE — 99283 EMERGENCY DEPT VISIT LOW MDM: CPT

## 2021-07-07 PROCEDURE — 85025 COMPLETE CBC W/AUTO DIFF WBC: CPT

## 2021-07-07 PROCEDURE — 36415 COLL VENOUS BLD VENIPUNCTURE: CPT

## 2021-07-07 PROCEDURE — 71045 X-RAY EXAM CHEST 1 VIEW: CPT

## 2021-07-07 RX ORDER — SODIUM CHLORIDE 0.9 % (FLUSH) 0.9 %
5-10 SYRINGE (ML) INJECTION ONCE
Status: DISCONTINUED | OUTPATIENT
Start: 2021-07-07 | End: 2021-07-08 | Stop reason: HOSPADM

## 2021-07-07 RX ORDER — ONDANSETRON 2 MG/ML
4 INJECTION INTRAMUSCULAR; INTRAVENOUS ONCE
Status: DISCONTINUED | OUTPATIENT
Start: 2021-07-07 | End: 2021-07-07

## 2021-07-07 RX ORDER — ONDANSETRON 4 MG/1
4 TABLET, ORALLY DISINTEGRATING ORAL
Status: DISCONTINUED | OUTPATIENT
Start: 2021-07-07 | End: 2021-07-08 | Stop reason: HOSPADM

## 2021-07-07 RX ORDER — ONDANSETRON 4 MG/1
4 TABLET, ORALLY DISINTEGRATING ORAL
Qty: 10 TABLET | Refills: 0 | Status: SHIPPED | OUTPATIENT
Start: 2021-07-07

## 2021-07-07 NOTE — ED TRIAGE NOTES
Pt c/o cough, clearing throat frequently, arrived via personal transport from [facility]. Shannan Soto the primary nurse at [facility] gave report stating pt started exhibiting these symptoms after dinner tonight. Pt had salmon, rice and soup for dinner.  95% on room air

## 2021-07-07 NOTE — ED NOTES
Called patient's daughter Samanta Costello, at pt request, to verify that she was aware that patient is here and is coming to be with her -she was made aware by [facility] nurse and is en route. Pt made aware.

## 2021-07-07 NOTE — ED PROVIDER NOTES
EMERGENCY DEPARTMENT HISTORY AND PHYSICAL EXAM      Date: 7/7/2021  Patient Name: Danny Craig    History of Presenting Illness     Chief Complaint   Patient presents with    Aspiration       History Provided By: Patient    HPI:   The history is provided by the patient. Vomiting   The current episode started 6 to 12 hours ago. The problem occurs 5 to 10 times per day. The problem has been resolved. The emesis has an appearance of stomach contents. There has been no fever. Pertinent negatives include no chills, no fever, no abdominal pain, no diarrhea, no headaches, no arthralgias, no cough, no URI and no headaches. The patient is not pregnant. Danny Craig, 80 y.o. female  presents to the ED with cc of vomiting since lunchtime. She vomited several times. Nursing reports she did eat salmon for dinner which she supposed to have a mechanical soft diet, they think she possibly aspirated. She denies any fevers chills. She denies any abdominal pain. Reports she vomited 5-6 times nonbloody nonbilious. She denies any diarrhea, she denies any abdominal pain at this time. She denies any fevers chills, she denies any shortness of breath or chest pain. Patient does have a valid DNR. There are no other complaints, changes, or physical findings at this time. PCP: Andrew Mitchell NP    No current facility-administered medications on file prior to encounter. Current Outpatient Medications on File Prior to Encounter   Medication Sig Dispense Refill    HYDROcodone-acetaminophen (NORCO) 5-325 mg per tablet Take 1 Tab by mouth every four (4) hours as needed.  DULoxetine (CYMBALTA) 30 mg capsule Take 30 mg by mouth daily.  mirtazapine (REMERON) 15 mg tablet Take 7.5 mg by mouth nightly.  levothyroxine (SYNTHROID) 50 mcg tablet TAKE 1 TABLET BY MOUTH EVERY DAY (Patient taking differently: Take 75 mcg by mouth Daily (before breakfast). ) 30 Tab 11    VITAMIN B-12 1,000 mcg tablet TAKE 1 TABLET BY MOUTH ONCE DAILY 30 Tab 2    gabapentin (NEURONTIN) 300 mg capsule TAKE 2 CAPSULES BY MOUTH EVERY MORNING AND EVERY EVENING 120 Cap prn    aspirin delayed-release 81 mg tablet TAKE 1 TABLET BY MOUTH ONCE DAILY 100 Tab 3    losartan (COZAAR) 50 mg tablet TAKE 1 TABLET BY MOUTH ONCE DAILY (Patient taking differently: Take 25 mg by mouth daily.) 30 Tab 11    gabapentin (NEURONTIN) 100 mg capsule TAKE 1 CAPSULE BY MOUTH ONCE DAILY AT NOON 30 Cap 11    propranolol (INDERAL) 60 mg tablet TAKE 1 TABLET BY MOUTH THREE TIMES DAILY 90 Tab 11    diclofenac (VOLTAREN) 1 % gel Apply  to affected area daily as needed.  MAPAP 325 mg tablet TAKE 2 TABLETS BY MOUTH EVERY 4 HOURS FOR ORAL TEMPERATURE > 100.5 OR COMPLAINT OF PAIN. 60 Tab prn    POLYVINYL ALCOHOL/POVIDONE (ARTIFICIAL TEARS OP) Apply  to eye daily as needed.  eyelid cleanser combination #6 (EYELID WIPES) towl by Apply Externally route daily.  mineral oil (FLEET) enema Insert  into rectum as needed for Constipation.  calcium-cholecalciferol, D3, (CALTRATE 600+D) tablet Take 1 Tab by mouth two (2) times a day. 60 Tab 1    LORazepam (ATIVAN) 0.5 mg tablet Take 0.5 mg by mouth two (2) times daily as needed for Anxiety.  loperamide (IMODIUM) 1 mg/7.5 mL solution Take  by mouth three (3) times daily as needed for Diarrhea.  docusate sodium (COLACE) 100 mg capsule Take 100 mg by mouth as needed for Constipation. PRN daily      polyethylene glycol (MIRALAX) 17 gram/dose powder Take 17 g by mouth daily as needed.  Zinc Ox-Aloe Vera-Vitamin E (BALMEX) 11.3 % topical cream Apply  to affected area as needed for Skin Irritation.  magnesium hydroxide (MILK OF MAGNESIA) 400 mg/5 mL suspension Take 30 mL by mouth daily as needed for Constipation.  bisacodyl (DULCOLAX) 10 mg suppository Insert 10 mg into rectum daily as needed.  SENNOSIDES (NATURAL LAXATIVE PO) Take  by mouth daily as needed.          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 No pertinent surgical history. Family History:  History reviewed. No pertinent family history. Social History:  Social History     Tobacco Use    Smoking status: Former Smoker    Smokeless tobacco: Never Used    Tobacco comment: quit about 50 years ago   Substance Use Topics    Alcohol use: Yes     Alcohol/week: 7.0 standard drinks     Types: 7 Glasses of wine per week     Comment: 1 glass of wine daily    Drug use: Never       Allergies: Allergies   Allergen Reactions    Ambien [Zolpidem] Unknown (comments)    Coconut Unknown (comments)         Review of Systems   Review of Systems   Constitutional: Negative. Negative for chills and fever. HENT: Negative. Negative for congestion and sore throat. Eyes: Negative. Negative for discharge and redness. Respiratory: Negative. Negative for cough and shortness of breath. Cardiovascular: Negative. Negative for chest pain and palpitations. Gastrointestinal: Positive for vomiting. Negative for abdominal pain, diarrhea and nausea. Endocrine: Negative. Negative for polydipsia and polyuria. Genitourinary: Negative. Negative for dysuria, flank pain and frequency. Musculoskeletal: Negative. Negative for arthralgias and back pain. Skin: Negative. Negative for rash and wound. Allergic/Immunologic: Negative. Neurological: Negative. Negative for dizziness, syncope and headaches. Hematological: Negative. Negative for adenopathy. Does not bruise/bleed easily. Psychiatric/Behavioral: Negative. Negative for confusion. The patient is not nervous/anxious. All other systems reviewed and are negative. Physical Exam   Physical Exam  Vitals and nursing note reviewed. Constitutional:       General: She is not in acute distress. Appearance: Normal appearance. She is well-developed and normal weight. She is not ill-appearing, toxic-appearing or diaphoretic. HENT:      Head: Normocephalic and atraumatic. Nose: Nose normal.      Mouth/Throat:      Mouth: Mucous membranes are moist.      Pharynx: Oropharynx is clear. Eyes:      Extraocular Movements: Extraocular movements intact. Conjunctiva/sclera: Conjunctivae normal.      Pupils: Pupils are equal, round, and reactive to light. Cardiovascular:      Rate and Rhythm: Normal rate and regular rhythm. Pulses: Normal pulses. Heart sounds: Normal heart sounds. Pulmonary:      Effort: Pulmonary effort is normal. No respiratory distress. Breath sounds: Normal breath sounds. No wheezing. Abdominal:      General: There is no distension. Palpations: Abdomen is soft. There is no mass. Tenderness: There is no abdominal tenderness. There is no guarding or rebound. Hernia: No hernia is present. Musculoskeletal:         General: No swelling, tenderness or signs of injury. Normal range of motion. Cervical back: Normal range of motion and neck supple. No rigidity or tenderness. No muscular tenderness. Skin:     General: Skin is warm. Capillary Refill: Capillary refill takes less than 2 seconds. Findings: No erythema or rash. Neurological:      General: No focal deficit present. Mental Status: She is alert and oriented to person, place, and time. Mental status is at baseline. Cranial Nerves: No cranial nerve deficit. Sensory: No sensory deficit.    Psychiatric:         Mood and Affect: Mood normal.         Behavior: Behavior normal.         Diagnostic Study Results     Labs -     Recent Results (from the past 12 hour(s))   CBC WITH AUTOMATED DIFF Collection Time: 07/07/21  7:39 PM   Result Value Ref Range    WBC 10.4 3.6 - 11.0 K/uL    RBC 3.71 (L) 3.80 - 5.20 M/uL    HGB 12.6 11.5 - 16.0 g/dL    HCT 37.5 35.0 - 47.0 %    .1 (H) 80.0 - 99.0 FL    MCH 34.0 26.0 - 34.0 PG    MCHC 33.6 30.0 - 36.5 g/dL    RDW 13.2 11.5 - 14.5 %    PLATELET 991 154 - 483 K/uL    MPV 10.3 8.9 - 12.9 FL    NRBC 0.0 0  WBC    ABSOLUTE NRBC 0.00 0.00 - 0.01 K/uL    NEUTROPHILS 55 32 - 75 %    BAND NEUTROPHILS 6 %    LYMPHOCYTES 10 (L) 12 - 49 %    MONOCYTES 3 (L) 5 - 13 %    EOSINOPHILS 26 (H) 0 - 7 %    BASOPHILS 0 0 - 1 %    IMMATURE GRANULOCYTES 0 0.0 - 0.5 %    ABS. NEUTROPHILS 6.4 1.8 - 8.0 K/UL    ABS. LYMPHOCYTES 1.0 0.8 - 3.5 K/UL    ABS. MONOCYTES 0.3 0.0 - 1.0 K/UL    ABS. EOSINOPHILS 2.7 (H) 0.0 - 0.4 K/UL    ABS. BASOPHILS 0.0 0.0 - 0.1 K/UL    ABS. IMM. GRANS. 0.0 0.00 - 0.04 K/UL    DF MANUAL      PLATELET COMMENTS ADEQUATE PLATELETS      RBC COMMENTS ANISOCYTOSIS  1+       METABOLIC PANEL, COMPREHENSIVE    Collection Time: 07/07/21  7:39 PM   Result Value Ref Range    Sodium 129 (L) 136 - 145 mmol/L    Potassium 4.5 3.5 - 5.1 mmol/L    Chloride 96 (L) 97 - 108 mmol/L    CO2 25 21 - 32 mmol/L    Anion gap 8 5 - 15 mmol/L    Glucose 112 (H) 65 - 100 mg/dL    BUN 15 6 - 20 MG/DL    Creatinine 1.12 (H) 0.55 - 1.02 MG/DL    BUN/Creatinine ratio 13 12 - 20      GFR est AA 54 (L) >60 ml/min/1.73m2    GFR est non-AA 45 (L) >60 ml/min/1.73m2    Calcium 9.3 8.5 - 10.1 MG/DL    Bilirubin, total 0.3 0.2 - 1.0 MG/DL    ALT (SGPT) 23 12 - 78 U/L    AST (SGOT) 27 15 - 37 U/L    Alk.  phosphatase 113 45 - 117 U/L    Protein, total 7.5 6.4 - 8.2 g/dL    Albumin 3.2 (L) 3.5 - 5.0 g/dL    Globulin 4.3 (H) 2.0 - 4.0 g/dL    A-G Ratio 0.7 (L) 1.1 - 2.2     LIPASE    Collection Time: 07/07/21  7:39 PM   Result Value Ref Range    Lipase 110 73 - 393 U/L   TROPONIN I    Collection Time: 07/07/21  7:39 PM   Result Value Ref Range    Troponin-I, Qt. <0.05 <0.05 ng/mL URINALYSIS W/ RFLX MICROSCOPIC    Collection Time: 07/07/21  7:47 PM   Result Value Ref Range    Color YELLOW/STRAW      Appearance CLEAR CLEAR      Specific gravity 1.015 1.003 - 1.030      pH (UA) 6.5 5.0 - 8.0      Protein Negative NEG mg/dL    Glucose Negative NEG mg/dL    Ketone Negative NEG mg/dL    Bilirubin Negative NEG      Blood Negative NEG      Urobilinogen 0.2 0.2 - 1.0 EU/dL    Nitrites Negative NEG      Leukocyte Esterase Negative NEG         Radiologic Studies -   XR CHEST PORT   Final Result   No acute cardiopulmonary disease. CT Results  (Last 48 hours)    None        CXR Results  (Last 48 hours)               07/07/21 1932  XR CHEST PORT Final result    Impression:  No acute cardiopulmonary disease. Narrative:  INDICATION: Cough, clearing throat frequently. Portable AP semi-upright view of the chest.       There is no prior study for direct comparison. Cardiomediastinal silhouette is within normal limits. Intact pacer leads overlie   the right atrium and right ventricle. Lungs are clear bilaterally. Pleural   spaces are normal and there is no pneumothorax. Osseous structures are diffusely   demineralized, but intact. Medical Decision Making   I am the first provider for this patient. I reviewed the vital signs, available nursing notes, past medical history, past surgical history, family history and social history. Vital Signs-Reviewed the patient's vital signs. Patient Vitals for the past 12 hrs:   Temp Pulse Resp BP SpO2   07/07/21 1905 98.6 °F (37 °C) 90 20 (!) 147/69 99 %               Records Reviewed: Nursing Notes and Old Medical Records    Provider Notes (Medical Decision Making):   Patient presents with report of vomiting questionable aspiration, will check laboratory studies and x-ray. ED Course:   Initial assessment performed.  The patients presenting problems have been discussed, and they are in agreement with the care plan formulated and outlined with them. I have encouraged them to ask questions as they arise throughout their visit. ED Course as of Jul 07 2054   Wed Jul 07, 2021 2039 Dated patient and family laboratory studies, patient's had no vomiting in the department. Family reports the facility told him that they thought something was stuck in her throat. [MF]   2052 Patient drank a cup of water without difficulty, she reports the water went down without any sensation of it hanging, she no coughing or gagging. Patient has no complaints reports no pain ready to be discharged    [MF]      ED Course User Index  [MF] Jaime Herron MD         Medications Given in the ED:    Medications   sodium chloride (NS) flush 5-10 mL (has no administration in time range)   ondansetron (ZOFRAN ODT) tablet 4 mg (has no administration in time range)           8:54 PM    Patient presents after vomiting spell at the nursing home, nursing home report that she was eating and had a coughing spell they are concerned about aspiration or food stuck in her esophagus. Patient has no evidence of any esophageal obstruction, she drank water without difficulty. Laboratory studies unremarkable except for low sodium slight elevation of creatinine which is baseline for her. Chest x-ray was clear. Patient reports she feels better and is ready to be discharged, will give her nausea medicine take as needed she will see her family doctor for follow-up    Pt has been re-examined and states that they are feeling better and have no new complaints. Laboratory tests, medications, x-rays, diagnosis, follow up plan and return instructions have been reviewed and discussed with the patient and/or family. Pt and/or family were instructed on symptoms that may arise after discharge requiring re-evaluation by a physician. Pt and/or family have had the opportunity to ask questions about their care.  Patient and/or family verbalized understanding and agreement with care plan, follow up and return instructions. Patient and/or family agree to return in 50 hours if their symptoms are not improving or immediately if they have any change in their condition. I have also put together some discharge instructions for patient that include: 1) educational information regarding their diagnosis, 2) how to care for their diagnosis at home, as well a 3) list of reasons why they would want to return to the ED prior to their follow-up appointment, should their condition change. David Vargas MD      Procedures          Disposition:    Discharged      DISCHARGE PLAN:  1. Current Discharge Medication List      START taking these medications    Details   ondansetron (Zofran ODT) 4 mg disintegrating tablet Take 1 Tablet by mouth every eight (8) hours as needed for Nausea. Qty: 10 Tablet, Refills: 0  Start date: 7/7/2021           2. Follow-up Information     Follow up With Specialties Details Why Contact Info    Sammy Vázquez NP Nurse Practitioner Schedule an appointment as soon as possible for a visit in 2 days For follow up [phone]  [phone]          3. Return to ED if worse     Diagnosis     Clinical Impression:   1. Dysphagia, unspecified type    2. Non-intractable vomiting without nausea, unspecified vomiting type        Attestations: David Vargas MD    Please note that this dictation was completed with k[employer], the computer voice recognition software. Quite often unanticipated grammatical, syntax, homophones, and other interpretive errors are inadvertently transcribed by the computer software. Please disregard these errors. Please excuse any errors that have escaped final proofreading. Thank you.

## 2021-10-11 ENCOUNTER — OFFICE VISIT (OUTPATIENT)
Dept: CARDIOLOGY CLINIC | Age: 86
End: 2021-10-11
Payer: MEDICARE

## 2021-10-11 DIAGNOSIS — I49.5 SINUS NODE DYSFUNCTION (HCC): ICD-10-CM

## 2021-10-11 DIAGNOSIS — Z95.0 CARDIAC PACEMAKER IN SITU: Primary | ICD-10-CM

## 2021-10-11 PROCEDURE — 93296 REM INTERROG EVL PM/IDS: CPT | Performed by: INTERNAL MEDICINE

## 2021-10-11 PROCEDURE — 93294 REM INTERROG EVL PM/LDLS PM: CPT | Performed by: INTERNAL MEDICINE

## 2021-11-29 ENCOUNTER — HOSPITAL ENCOUNTER (OUTPATIENT)
Dept: LAB | Age: 86
Discharge: HOME | End: 2021-11-29
Payer: MEDICARE

## 2021-11-29 PROCEDURE — 87147 CULTURE TYPE IMMUNOLOGIC: CPT

## 2021-11-29 PROCEDURE — 87205 SMEAR GRAM STAIN: CPT

## 2021-11-29 PROCEDURE — 87186 SC STD MICRODIL/AGAR DIL: CPT

## 2021-11-29 PROCEDURE — 87077 CULTURE AEROBIC IDENTIFY: CPT

## 2021-11-29 PROCEDURE — 87185 SC STD ENZYME DETCJ PER NZM: CPT

## 2021-12-03 LAB
BACTERIA SPEC CULT: ABNORMAL
SERVICE CMNT-IMP: ABNORMAL

## 2022-01-17 ENCOUNTER — OFFICE VISIT (OUTPATIENT)
Dept: CARDIOLOGY CLINIC | Age: 87
End: 2022-01-17
Payer: MEDICARE

## 2022-01-17 DIAGNOSIS — Z95.0 CARDIAC PACEMAKER IN SITU: Primary | ICD-10-CM

## 2022-01-17 DIAGNOSIS — I49.5 SINUS NODE DYSFUNCTION (HCC): ICD-10-CM

## 2022-01-17 PROCEDURE — 93296 REM INTERROG EVL PM/IDS: CPT | Performed by: INTERNAL MEDICINE

## 2022-01-17 PROCEDURE — 93294 REM INTERROG EVL PM/LDLS PM: CPT | Performed by: INTERNAL MEDICINE

## 2022-04-18 ENCOUNTER — OFFICE VISIT (OUTPATIENT)
Dept: CARDIOLOGY CLINIC | Age: 87
End: 2022-04-18
Payer: MEDICARE

## 2022-04-18 DIAGNOSIS — Z95.0 CARDIAC PACEMAKER IN SITU: Primary | ICD-10-CM

## 2022-04-18 DIAGNOSIS — I49.5 SINUS NODE DYSFUNCTION (HCC): ICD-10-CM

## 2022-04-18 PROCEDURE — 93294 REM INTERROG EVL PM/LDLS PM: CPT | Performed by: INTERNAL MEDICINE

## 2022-04-18 PROCEDURE — 93296 REM INTERROG EVL PM/IDS: CPT | Performed by: INTERNAL MEDICINE

## 2022-06-23 ENCOUNTER — HOSPITAL ENCOUNTER (OUTPATIENT)
Dept: LAB | Age: 87
Discharge: HOME | End: 2022-06-23
Payer: MEDICARE

## 2022-06-23 LAB
ANION GAP SERPL CALC-SCNC: 9 MMOL/L (ref 5–15)
BUN SERPL-MCNC: 27 MG/DL (ref 6–20)
BUN/CREAT SERPL: 18 (ref 12–20)
CALCIUM SERPL-MCNC: 9.4 MG/DL (ref 8.5–10.1)
CHLORIDE SERPL-SCNC: 102 MMOL/L (ref 97–108)
CO2 SERPL-SCNC: 30 MMOL/L (ref 21–32)
CREAT SERPL-MCNC: 1.51 MG/DL (ref 0.55–1.02)
GLUCOSE SERPL-MCNC: 99 MG/DL (ref 65–100)
POTASSIUM SERPL-SCNC: 4.7 MMOL/L (ref 3.5–5.1)
SODIUM SERPL-SCNC: 141 MMOL/L (ref 136–145)

## 2022-06-23 PROCEDURE — 80048 BASIC METABOLIC PNL TOTAL CA: CPT

## 2022-07-05 ENCOUNTER — HOSPITAL ENCOUNTER (OUTPATIENT)
Dept: LAB | Age: 87
Discharge: HOME | End: 2022-07-05

## 2022-10-30 ENCOUNTER — HOSPITAL ENCOUNTER (OUTPATIENT)
Dept: LAB | Age: 87
Discharge: HOME | End: 2022-10-30

## 2022-10-30 ENCOUNTER — HOSPITAL ENCOUNTER (OUTPATIENT)
Dept: LAB | Age: 87
Discharge: HOME | End: 2022-10-30
Payer: MEDICARE

## 2022-10-30 LAB
ALBUMIN SERPL-MCNC: 3.2 G/DL (ref 3.5–5)
ALBUMIN/GLOB SERPL: 0.9 {RATIO} (ref 1.1–2.2)
ALP SERPL-CCNC: 140 U/L (ref 45–117)
ALT SERPL-CCNC: 19 U/L (ref 12–78)
ANION GAP SERPL CALC-SCNC: 8 MMOL/L (ref 5–15)
APPEARANCE UR: CLEAR
AST SERPL-CCNC: 24 U/L (ref 15–37)
BACTERIA URNS QL MICRO: ABNORMAL /HPF
BASOPHILS # BLD: 0.1 K/UL (ref 0–0.1)
BASOPHILS NFR BLD: 1 % (ref 0–1)
BILIRUB SERPL-MCNC: 0.3 MG/DL (ref 0.2–1)
BILIRUB UR QL: NEGATIVE
BUN SERPL-MCNC: 17 MG/DL (ref 6–20)
BUN/CREAT SERPL: 14 (ref 12–20)
CALCIUM SERPL-MCNC: 8.6 MG/DL (ref 8.5–10.1)
CHLORIDE SERPL-SCNC: 97 MMOL/L (ref 97–108)
CO2 SERPL-SCNC: 27 MMOL/L (ref 21–32)
COLOR UR: ABNORMAL
CREAT SERPL-MCNC: 1.25 MG/DL (ref 0.55–1.02)
DIFFERENTIAL METHOD BLD: ABNORMAL
EOSINOPHIL # BLD: 0.9 K/UL (ref 0–0.4)
EOSINOPHIL NFR BLD: 9 % (ref 0–7)
EPITH CASTS URNS QL MICRO: ABNORMAL /LPF
ERYTHROCYTE [DISTWIDTH] IN BLOOD BY AUTOMATED COUNT: 12.8 % (ref 11.5–14.5)
GLOBULIN SER CALC-MCNC: 3.5 G/DL (ref 2–4)
GLUCOSE SERPL-MCNC: 140 MG/DL (ref 65–100)
GLUCOSE UR STRIP.AUTO-MCNC: NEGATIVE MG/DL
HCT VFR BLD AUTO: 33.7 % (ref 35–47)
HGB BLD-MCNC: 12.3 G/DL (ref 11.5–16)
HGB UR QL STRIP: NEGATIVE
IMM GRANULOCYTES # BLD AUTO: 0.1 K/UL (ref 0–0.04)
IMM GRANULOCYTES NFR BLD AUTO: 1 % (ref 0–0.5)
KETONES UR QL STRIP.AUTO: NEGATIVE MG/DL
LEUKOCYTE ESTERASE UR QL STRIP.AUTO: ABNORMAL
LYMPHOCYTES # BLD: 0.9 K/UL (ref 0.8–3.5)
LYMPHOCYTES NFR BLD: 9 % (ref 12–49)
MCH RBC QN AUTO: 35.1 PG (ref 26–34)
MCHC RBC AUTO-ENTMCNC: 36.5 G/DL (ref 30–36.5)
MCV RBC AUTO: 96.3 FL (ref 80–99)
MONOCYTES # BLD: 0.7 K/UL (ref 0–1)
MONOCYTES NFR BLD: 7 % (ref 5–13)
NEUTS SEG # BLD: 7.2 K/UL (ref 1.8–8)
NEUTS SEG NFR BLD: 73 % (ref 32–75)
NITRITE UR QL STRIP.AUTO: NEGATIVE
NRBC # BLD: 0 K/UL (ref 0–0.01)
NRBC BLD-RTO: 0 PER 100 WBC
PH UR STRIP: 7 [PH] (ref 5–8)
PLATELET # BLD AUTO: 265 K/UL (ref 150–400)
PMV BLD AUTO: 10.7 FL (ref 8.9–12.9)
POTASSIUM SERPL-SCNC: 4.5 MMOL/L (ref 3.5–5.1)
PROT SERPL-MCNC: 6.7 G/DL (ref 6.4–8.2)
PROT UR STRIP-MCNC: NEGATIVE MG/DL
RBC # BLD AUTO: 3.5 M/UL (ref 3.8–5.2)
RBC #/AREA URNS HPF: ABNORMAL /HPF (ref 0–5)
SODIUM SERPL-SCNC: 132 MMOL/L (ref 136–145)
SP GR UR REFRACTOMETRY: 1.01 (ref 1–1.03)
UROBILINOGEN UR QL STRIP.AUTO: 0.2 EU/DL (ref 0.2–1)
WBC # BLD AUTO: 9.8 K/UL (ref 3.6–11)
WBC URNS QL MICRO: ABNORMAL /HPF (ref 0–4)

## 2022-10-30 PROCEDURE — 81001 URINALYSIS AUTO W/SCOPE: CPT

## 2022-10-30 PROCEDURE — 80053 COMPREHEN METABOLIC PANEL: CPT

## 2022-10-30 PROCEDURE — 85025 COMPLETE CBC W/AUTO DIFF WBC: CPT

## 2022-10-30 PROCEDURE — 87086 URINE CULTURE/COLONY COUNT: CPT

## 2022-11-02 LAB
BACTERIA SPEC CULT: NORMAL
CC UR VC: NORMAL
SERVICE CMNT-IMP: NORMAL

## 2022-11-16 ENCOUNTER — HOSPITAL ENCOUNTER (OUTPATIENT)
Dept: LAB | Age: 87
Discharge: HOME | End: 2022-11-16
Payer: MEDICARE

## 2022-11-16 LAB — SODIUM SERPL-SCNC: 129 MMOL/L (ref 136–145)

## 2022-11-16 PROCEDURE — 84295 ASSAY OF SERUM SODIUM: CPT

## 2023-08-03 ENCOUNTER — HOSPITAL ENCOUNTER (OUTPATIENT)
Facility: HOSPITAL | Age: 88
Setting detail: SPECIMEN
Discharge: HOME | End: 2023-08-03
Payer: MEDICARE

## 2023-08-03 LAB
ANION GAP SERPL CALC-SCNC: 11 MMOL/L (ref 5–15)
BASOPHILS # BLD: 0.2 K/UL (ref 0–0.1)
BASOPHILS NFR BLD: 2 % (ref 0–1)
BUN SERPL-MCNC: 17 MG/DL (ref 6–20)
BUN/CREAT SERPL: 14 (ref 12–20)
CALCIUM SERPL-MCNC: 8.6 MG/DL (ref 8.5–10.1)
CHLORIDE SERPL-SCNC: 100 MMOL/L (ref 97–108)
CO2 SERPL-SCNC: 27 MMOL/L (ref 21–32)
CREAT SERPL-MCNC: 1.19 MG/DL (ref 0.55–1.02)
DIFFERENTIAL METHOD BLD: ABNORMAL
EOSINOPHIL # BLD: 1 K/UL (ref 0–0.4)
EOSINOPHIL NFR BLD: 12 % (ref 0–7)
ERYTHROCYTE [DISTWIDTH] IN BLOOD BY AUTOMATED COUNT: 13 % (ref 11.5–14.5)
FOLATE SERPL-MCNC: 16 NG/ML (ref 5–21)
GLUCOSE SERPL-MCNC: 142 MG/DL (ref 65–100)
HCT VFR BLD AUTO: 31.9 % (ref 35–47)
HGB BLD-MCNC: 11.2 G/DL (ref 11.5–16)
IMM GRANULOCYTES # BLD AUTO: 0.1 K/UL (ref 0–0.04)
IMM GRANULOCYTES NFR BLD AUTO: 1 % (ref 0–0.5)
IRON SATN MFR SERPL: 35 % (ref 20–50)
IRON SERPL-MCNC: 82 UG/DL (ref 50–170)
LYMPHOCYTES # BLD: 1.2 K/UL (ref 0.8–3.5)
LYMPHOCYTES NFR BLD: 15 % (ref 12–49)
MCH RBC QN AUTO: 35.2 PG (ref 26–34)
MCHC RBC AUTO-ENTMCNC: 35.1 G/DL (ref 30–36.5)
MCV RBC AUTO: 100.3 FL (ref 80–99)
MONOCYTES # BLD: 0.9 K/UL (ref 0–1)
MONOCYTES NFR BLD: 11 % (ref 5–13)
NEUTS SEG # BLD: 4.7 K/UL (ref 1.8–8)
NEUTS SEG NFR BLD: 59 % (ref 32–75)
NRBC # BLD: 0.03 K/UL (ref 0–0.01)
NRBC BLD-RTO: 0.4 PER 100 WBC
PLATELET # BLD AUTO: 238 K/UL (ref 150–400)
PLATELET COMMENT: ABNORMAL
PMV BLD AUTO: 11.1 FL (ref 8.9–12.9)
POTASSIUM SERPL-SCNC: 4.3 MMOL/L (ref 3.5–5.1)
RBC # BLD AUTO: 3.18 M/UL (ref 3.8–5.2)
RBC MORPH BLD: ABNORMAL
SODIUM SERPL-SCNC: 138 MMOL/L (ref 136–145)
TIBC SERPL-MCNC: 231 UG/DL (ref 250–450)
VIT B12 SERPL-MCNC: 1200 PG/ML (ref 193–986)
WBC # BLD AUTO: 8.1 K/UL (ref 3.6–11)

## 2023-08-03 PROCEDURE — 83540 ASSAY OF IRON: CPT

## 2023-08-03 PROCEDURE — 82746 ASSAY OF FOLIC ACID SERUM: CPT

## 2023-08-03 PROCEDURE — 83550 IRON BINDING TEST: CPT

## 2023-08-03 PROCEDURE — 85025 COMPLETE CBC W/AUTO DIFF WBC: CPT

## 2023-08-03 PROCEDURE — 80048 BASIC METABOLIC PNL TOTAL CA: CPT

## 2023-08-03 PROCEDURE — 82607 VITAMIN B-12: CPT

## 2024-03-25 ENCOUNTER — HOSPITAL ENCOUNTER (OUTPATIENT)
Facility: HOSPITAL | Age: 89
Setting detail: SPECIMEN
Discharge: HOME | End: 2024-03-28
Payer: MEDICARE

## 2024-03-25 LAB
APPEARANCE UR: ABNORMAL
BACTERIA URNS QL MICRO: ABNORMAL /HPF
BILIRUB UR QL CFM: NEGATIVE
COLOR UR: ABNORMAL
EPITH CASTS URNS QL MICRO: ABNORMAL /LPF
GLUCOSE UR STRIP.AUTO-MCNC: NEGATIVE MG/DL
HGB UR QL STRIP: NEGATIVE
KETONES UR QL STRIP.AUTO: 15 MG/DL
LEUKOCYTE ESTERASE UR QL STRIP.AUTO: ABNORMAL
NITRITE UR QL STRIP.AUTO: NEGATIVE
PH UR STRIP: 6.5 (ref 5–8)
PROT UR STRIP-MCNC: ABNORMAL MG/DL
RBC #/AREA URNS HPF: ABNORMAL /HPF (ref 0–5)
SP GR UR REFRACTOMETRY: 1.02 (ref 1–1.03)
UROBILINOGEN UR QL STRIP.AUTO: 0.2 EU/DL (ref 0.2–1)
WBC URNS QL MICRO: ABNORMAL /HPF (ref 0–4)

## 2024-03-25 PROCEDURE — 87088 URINE BACTERIA CULTURE: CPT

## 2024-03-25 PROCEDURE — 87186 SC STD MICRODIL/AGAR DIL: CPT

## 2024-03-25 PROCEDURE — 87086 URINE CULTURE/COLONY COUNT: CPT

## 2024-03-25 PROCEDURE — 81001 URINALYSIS AUTO W/SCOPE: CPT

## 2024-03-28 LAB
BACTERIA SPEC CULT: ABNORMAL
CC UR VC: ABNORMAL
SERVICE CMNT-IMP: ABNORMAL

## 2024-06-17 ENCOUNTER — HOSPITAL ENCOUNTER (OUTPATIENT)
Facility: HOSPITAL | Age: 89
Setting detail: SPECIMEN
Discharge: HOME | End: 2024-06-20
Payer: MEDICARE

## 2024-06-17 LAB
ALBUMIN SERPL-MCNC: 3 G/DL (ref 3.5–5)
ALBUMIN/GLOB SERPL: 0.9 (ref 1.1–2.2)
ALP SERPL-CCNC: 106 U/L (ref 45–117)
ALT SERPL-CCNC: 15 U/L (ref 12–78)
ANION GAP SERPL CALC-SCNC: 22 MMOL/L (ref 5–15)
AST SERPL-CCNC: 23 U/L (ref 15–37)
BASOPHILS # BLD: 0.1 K/UL (ref 0–0.1)
BASOPHILS NFR BLD: 0 % (ref 0–1)
BILIRUB SERPL-MCNC: 0.6 MG/DL (ref 0.2–1)
BUN SERPL-MCNC: 61 MG/DL (ref 6–20)
BUN/CREAT SERPL: 33 (ref 12–20)
CALCIUM SERPL-MCNC: 8.5 MG/DL (ref 8.5–10.1)
CHLORIDE SERPL-SCNC: 107 MMOL/L (ref 97–108)
CO2 SERPL-SCNC: 17 MMOL/L (ref 21–32)
CREAT SERPL-MCNC: 1.86 MG/DL (ref 0.55–1.02)
DIFFERENTIAL METHOD BLD: ABNORMAL
EOSINOPHIL # BLD: 0 K/UL (ref 0–0.4)
EOSINOPHIL NFR BLD: 0 % (ref 0–7)
ERYTHROCYTE [DISTWIDTH] IN BLOOD BY AUTOMATED COUNT: 13.7 % (ref 11.5–14.5)
GLOBULIN SER CALC-MCNC: 3.4 G/DL (ref 2–4)
GLUCOSE SERPL-MCNC: 94 MG/DL (ref 65–100)
HCT VFR BLD AUTO: 34.3 % (ref 35–47)
HGB BLD-MCNC: 11.7 G/DL (ref 11.5–16)
IMM GRANULOCYTES # BLD AUTO: 0.1 K/UL (ref 0–0.04)
IMM GRANULOCYTES NFR BLD AUTO: 1 % (ref 0–0.5)
LYMPHOCYTES # BLD: 1.2 K/UL (ref 0.8–3.5)
LYMPHOCYTES NFR BLD: 11 % (ref 12–49)
MCH RBC QN AUTO: 34.7 PG (ref 26–34)
MCHC RBC AUTO-ENTMCNC: 34.1 G/DL (ref 30–36.5)
MCV RBC AUTO: 101.8 FL (ref 80–99)
MONOCYTES # BLD: 0.9 K/UL (ref 0–1)
MONOCYTES NFR BLD: 8 % (ref 5–13)
NEUTS SEG # BLD: 9.1 K/UL (ref 1.8–8)
NEUTS SEG NFR BLD: 80 % (ref 32–75)
NRBC # BLD: 0 K/UL (ref 0–0.01)
NRBC BLD-RTO: 0 PER 100 WBC
PLATELET # BLD AUTO: 261 K/UL (ref 150–400)
PMV BLD AUTO: 11.4 FL (ref 8.9–12.9)
POTASSIUM SERPL-SCNC: 3.2 MMOL/L (ref 3.5–5.1)
PROT SERPL-MCNC: 6.4 G/DL (ref 6.4–8.2)
RBC # BLD AUTO: 3.37 M/UL (ref 3.8–5.2)
SODIUM SERPL-SCNC: 146 MMOL/L (ref 136–145)
WBC # BLD AUTO: 11.3 K/UL (ref 3.6–11)

## 2024-06-17 PROCEDURE — 80053 COMPREHEN METABOLIC PANEL: CPT

## 2024-06-17 PROCEDURE — 85025 COMPLETE CBC W/AUTO DIFF WBC: CPT

## 2024-06-18 ENCOUNTER — HOSPITAL ENCOUNTER (OUTPATIENT)
Facility: HOSPITAL | Age: 89
Setting detail: SPECIMEN
Discharge: HOME | End: 2024-06-21
Payer: MEDICARE

## 2024-06-18 PROCEDURE — 87086 URINE CULTURE/COLONY COUNT: CPT

## 2024-06-18 PROCEDURE — 87088 URINE BACTERIA CULTURE: CPT

## 2024-06-18 PROCEDURE — 87186 SC STD MICRODIL/AGAR DIL: CPT

## 2024-06-20 LAB
ANION GAP SERPL CALC-SCNC: 12 MMOL/L (ref 5–15)
BASOPHILS # BLD: 0.1 K/UL (ref 0–0.1)
BASOPHILS NFR BLD: 0 % (ref 0–1)
BUN SERPL-MCNC: 55 MG/DL (ref 6–20)
BUN/CREAT SERPL: 41 (ref 12–20)
CALCIUM SERPL-MCNC: 8.3 MG/DL (ref 8.5–10.1)
CHLORIDE SERPL-SCNC: 104 MMOL/L (ref 97–108)
CO2 SERPL-SCNC: 28 MMOL/L (ref 21–32)
CREAT SERPL-MCNC: 1.33 MG/DL (ref 0.55–1.02)
DIFFERENTIAL METHOD BLD: ABNORMAL
EOSINOPHIL # BLD: 0.1 K/UL (ref 0–0.4)
EOSINOPHIL NFR BLD: 0 % (ref 0–7)
ERYTHROCYTE [DISTWIDTH] IN BLOOD BY AUTOMATED COUNT: 13.2 % (ref 11.5–14.5)
GLUCOSE SERPL-MCNC: 66 MG/DL (ref 65–100)
HCT VFR BLD AUTO: 37.1 % (ref 35–47)
HGB BLD-MCNC: 12.7 G/DL (ref 11.5–16)
IMM GRANULOCYTES # BLD AUTO: 0.2 K/UL (ref 0–0.04)
IMM GRANULOCYTES NFR BLD AUTO: 1 % (ref 0–0.5)
LYMPHOCYTES # BLD: 1.4 K/UL (ref 0.8–3.5)
LYMPHOCYTES NFR BLD: 8 % (ref 12–49)
MCH RBC QN AUTO: 34.7 PG (ref 26–34)
MCHC RBC AUTO-ENTMCNC: 34.2 G/DL (ref 30–36.5)
MCV RBC AUTO: 101.4 FL (ref 80–99)
MONOCYTES # BLD: 1.4 K/UL (ref 0–1)
MONOCYTES NFR BLD: 8 % (ref 5–13)
NEUTS SEG # BLD: 14.1 K/UL (ref 1.8–8)
NEUTS SEG NFR BLD: 83 % (ref 32–75)
NRBC # BLD: 0 K/UL (ref 0–0.01)
NRBC BLD-RTO: 0 PER 100 WBC
PLATELET # BLD AUTO: 224 K/UL (ref 150–400)
PMV BLD AUTO: 11.4 FL (ref 8.9–12.9)
POTASSIUM SERPL-SCNC: 3.1 MMOL/L (ref 3.5–5.1)
RBC # BLD AUTO: 3.66 M/UL (ref 3.8–5.2)
SODIUM SERPL-SCNC: 144 MMOL/L (ref 136–145)
WBC # BLD AUTO: 17.3 K/UL (ref 3.6–11)

## 2024-06-20 PROCEDURE — 80048 BASIC METABOLIC PNL TOTAL CA: CPT

## 2024-06-20 PROCEDURE — 85025 COMPLETE CBC W/AUTO DIFF WBC: CPT

## 2024-06-21 LAB
BACTERIA SPEC CULT: ABNORMAL
BACTERIA SPEC CULT: ABNORMAL
CC UR VC: ABNORMAL
SERVICE CMNT-IMP: ABNORMAL